# Patient Record
Sex: MALE | Race: WHITE | NOT HISPANIC OR LATINO | Employment: FULL TIME | URBAN - METROPOLITAN AREA
[De-identification: names, ages, dates, MRNs, and addresses within clinical notes are randomized per-mention and may not be internally consistent; named-entity substitution may affect disease eponyms.]

---

## 2021-11-01 ENCOUNTER — OFFICE VISIT (OUTPATIENT)
Dept: URGENT CARE | Facility: URGENT CARE | Age: 36
End: 2021-11-01
Payer: COMMERCIAL

## 2021-11-01 VITALS
DIASTOLIC BLOOD PRESSURE: 78 MMHG | HEART RATE: 57 BPM | SYSTOLIC BLOOD PRESSURE: 124 MMHG | OXYGEN SATURATION: 98 % | TEMPERATURE: 98 F

## 2021-11-01 DIAGNOSIS — S61.411A LACERATION OF RIGHT HAND WITHOUT FOREIGN BODY, INITIAL ENCOUNTER: Primary | ICD-10-CM

## 2021-11-01 PROCEDURE — 12001 RPR S/N/AX/GEN/TRNK 2.5CM/<: CPT | Performed by: PHYSICIAN ASSISTANT

## 2021-11-01 RX ORDER — ARIPIPRAZOLE 15 MG/1
TABLET ORAL
COMMUNITY
Start: 2020-01-02 | End: 2022-10-17

## 2021-11-01 NOTE — PROGRESS NOTES
SUBJECTIVE:     Chief Complaint   Patient presents with     Urgent Care     laceration on R hand between thumb and pointer finger w can lid      Ángel Bardales is a 36 year old male who presents to the clinic with a laceration on the right hand sustained 1 hour(s) ago.  This is a non-work related and accidental injury.    Mechanism of injury: cut with metal top of vegetable can.    Associated symptoms: Denies numbness, weakness, or loss of function  Last tetanus booster within 10 years: yes    EXAM:   The patient appears today in alert,no apparent distress distress  VITALS: /78   Pulse 57   Temp 98  F (36.7  C)   SpO2 98%     Size of laceration: 2 centimeters  Characteristics of the laceration: bleeding- mild, clean, straight and transverse  Tendon function intact: yes  Sensation to light touch intact: yes  Pulses intact: yes  Picture included in patient's chart: no    Assessment:  Laceration of right hand without foreign body, initial encounter    PLAN:  PROCEDURE NOTE::  Wound was locally injected with 3 cc's of Lidocaine 2% with epinephrine  Good anesthesia was obtained  Prepped and draped in the usual sterile fashion  Wound cleaned with HIBICLENS  Wound irrigated  Betadine swabs  X 3   Laceration was closed using 4 5-0 nylon interrupted sutures  After care instructions:  Keep wound clean and dry for the next 24-48 hours  Sutures out in 1 week  Signs of infection discussed today  Apply anti-bacterial ointment for 2 days  May return to work as long as wound is kept clean and dry  Discussed the probability of scarring

## 2022-07-22 ENCOUNTER — HOSPITAL ENCOUNTER (EMERGENCY)
Dept: HOSPITAL 11 - JP.ED | Age: 37
LOS: 1 days | Discharge: OTHER INSTITUTION | End: 2022-07-23
Payer: MEDICAID

## 2022-07-22 DIAGNOSIS — F20.9: ICD-10-CM

## 2022-07-22 DIAGNOSIS — Z20.822: ICD-10-CM

## 2022-07-22 DIAGNOSIS — Z91.14: ICD-10-CM

## 2022-07-22 DIAGNOSIS — F31.9: Primary | ICD-10-CM

## 2022-07-22 LAB — APAP SERPL-MCNC: 0 UG/ML (ref 10–144.9)

## 2022-07-22 PROCEDURE — 81001 URINALYSIS AUTO W/SCOPE: CPT

## 2022-07-22 PROCEDURE — 99285 EMERGENCY DEPT VISIT HI MDM: CPT

## 2022-07-22 PROCEDURE — U0002 COVID-19 LAB TEST NON-CDC: HCPCS

## 2022-07-22 PROCEDURE — 80053 COMPREHEN METABOLIC PANEL: CPT

## 2022-07-22 PROCEDURE — 96372 THER/PROPH/DIAG INJ SC/IM: CPT

## 2022-07-22 PROCEDURE — 80305 DRUG TEST PRSMV DIR OPT OBS: CPT

## 2022-07-22 PROCEDURE — 80307 DRUG TEST PRSMV CHEM ANLYZR: CPT

## 2022-07-22 PROCEDURE — 85025 COMPLETE CBC W/AUTO DIFF WBC: CPT

## 2022-07-22 PROCEDURE — 36415 COLL VENOUS BLD VENIPUNCTURE: CPT

## 2022-07-22 PROCEDURE — 80143 DRUG ASSAY ACETAMINOPHEN: CPT

## 2022-07-22 PROCEDURE — 87635 SARS-COV-2 COVID-19 AMP PRB: CPT

## 2022-07-22 PROCEDURE — 99283 EMERGENCY DEPT VISIT LOW MDM: CPT

## 2022-07-22 PROCEDURE — 80179 DRUG ASSAY SALICYLATE: CPT

## 2022-09-06 DIAGNOSIS — Z79.899 ENCOUNTER FOR LONG-TERM (CURRENT) USE OF MEDICATIONS: Primary | ICD-10-CM

## 2022-09-06 DIAGNOSIS — F31.64: ICD-10-CM

## 2022-09-07 ENCOUNTER — LAB (OUTPATIENT)
Dept: LAB | Facility: CLINIC | Age: 37
End: 2022-09-07
Payer: COMMERCIAL

## 2022-09-07 DIAGNOSIS — F31.64: ICD-10-CM

## 2022-09-07 DIAGNOSIS — Z79.899 ENCOUNTER FOR LONG-TERM (CURRENT) USE OF MEDICATIONS: ICD-10-CM

## 2022-09-07 LAB
LITHIUM SERPL-SCNC: 0.5 MMOL/L (ref 0.6–1.2)
T4 SERPL-MCNC: 6.6 UG/DL (ref 4.5–11.7)

## 2022-09-07 PROCEDURE — 84436 ASSAY OF TOTAL THYROXINE: CPT

## 2022-09-07 PROCEDURE — 80178 ASSAY OF LITHIUM: CPT

## 2022-09-07 PROCEDURE — 36415 COLL VENOUS BLD VENIPUNCTURE: CPT

## 2022-09-07 PROCEDURE — 80048 BASIC METABOLIC PNL TOTAL CA: CPT

## 2022-09-07 PROCEDURE — 84443 ASSAY THYROID STIM HORMONE: CPT

## 2022-09-08 LAB
ANION GAP SERPL CALCULATED.3IONS-SCNC: 8 MMOL/L (ref 3–14)
BUN SERPL-MCNC: 13 MG/DL (ref 7–30)
CALCIUM SERPL-MCNC: 9 MG/DL (ref 8.5–10.1)
CHLORIDE BLD-SCNC: 105 MMOL/L (ref 94–109)
CO2 SERPL-SCNC: 22 MMOL/L (ref 20–32)
CREAT SERPL-MCNC: 0.74 MG/DL (ref 0.66–1.25)
GFR SERPL CREATININE-BSD FRML MDRD: >90 ML/MIN/1.73M2
GLUCOSE BLD-MCNC: 107 MG/DL (ref 70–99)
POTASSIUM BLD-SCNC: 3.4 MMOL/L (ref 3.4–5.3)
SODIUM SERPL-SCNC: 135 MMOL/L (ref 133–144)
TSH SERPL DL<=0.005 MIU/L-ACNC: 1.94 MU/L (ref 0.4–4)

## 2022-10-17 ENCOUNTER — OFFICE VISIT (OUTPATIENT)
Dept: FAMILY MEDICINE | Facility: CLINIC | Age: 37
End: 2022-10-17
Payer: COMMERCIAL

## 2022-10-17 VITALS
BODY MASS INDEX: 44.82 KG/M2 | SYSTOLIC BLOOD PRESSURE: 126 MMHG | HEART RATE: 93 BPM | DIASTOLIC BLOOD PRESSURE: 84 MMHG | HEIGHT: 68 IN | TEMPERATURE: 98 F | WEIGHT: 295.75 LBS

## 2022-10-17 DIAGNOSIS — F31.62 BIPOLAR 1 DISORDER, MIXED, MODERATE (H): ICD-10-CM

## 2022-10-17 DIAGNOSIS — Z23 NEED FOR COVID-19 VACCINE: ICD-10-CM

## 2022-10-17 DIAGNOSIS — R25.2 LEG CRAMPS: Primary | ICD-10-CM

## 2022-10-17 DIAGNOSIS — Z86.39 HISTORY OF DIABETES INSIPIDUS: ICD-10-CM

## 2022-10-17 DIAGNOSIS — Z11.59 NEED FOR HEPATITIS C SCREENING TEST: ICD-10-CM

## 2022-10-17 DIAGNOSIS — Z11.4 SCREENING FOR HIV (HUMAN IMMUNODEFICIENCY VIRUS): ICD-10-CM

## 2022-10-17 PROBLEM — F12.11 HISTORY OF CANNABIS ABUSE: Status: ACTIVE | Noted: 2018-06-17

## 2022-10-17 PROBLEM — E66.813 CLASS 3 SEVERE OBESITY DUE TO EXCESS CALORIES WITHOUT SERIOUS COMORBIDITY WITH BODY MASS INDEX (BMI) OF 40.0 TO 44.9 IN ADULT (H): Status: ACTIVE | Noted: 2019-04-16

## 2022-10-17 PROBLEM — E55.9 VITAMIN D INSUFFICIENCY: Status: ACTIVE | Noted: 2018-06-18

## 2022-10-17 PROBLEM — R10.9 FLANK PAIN: Status: ACTIVE | Noted: 2018-06-20

## 2022-10-17 PROBLEM — R45.851 SUICIDAL IDEATION: Status: ACTIVE | Noted: 2018-06-20

## 2022-10-17 PROBLEM — F99 INSOMNIA DUE TO OTHER MENTAL DISORDER: Status: ACTIVE | Noted: 2022-07-24

## 2022-10-17 PROBLEM — E66.01 CLASS 3 SEVERE OBESITY DUE TO EXCESS CALORIES WITHOUT SERIOUS COMORBIDITY WITH BODY MASS INDEX (BMI) OF 40.0 TO 44.9 IN ADULT (H): Status: ACTIVE | Noted: 2019-04-16

## 2022-10-17 PROBLEM — Z87.891 FORMER SMOKER: Status: ACTIVE | Noted: 2021-05-26

## 2022-10-17 PROBLEM — E78.2 MIXED HYPERLIPIDEMIA: Status: ACTIVE | Noted: 2021-10-05

## 2022-10-17 PROBLEM — F31.64: Status: ACTIVE | Noted: 2018-06-17

## 2022-10-17 PROBLEM — F31.9 BIPOLAR 1 DISORDER, DEPRESSED (H): Status: ACTIVE | Noted: 2020-01-28

## 2022-10-17 PROBLEM — K21.9 GERD (GASTROESOPHAGEAL REFLUX DISEASE): Status: ACTIVE | Noted: 2018-06-20

## 2022-10-17 PROBLEM — F51.05 INSOMNIA DUE TO OTHER MENTAL DISORDER: Status: ACTIVE | Noted: 2022-07-24

## 2022-10-17 PROCEDURE — 0124A COVID-19,PF,PFIZER BOOSTER BIVALENT: CPT | Performed by: FAMILY MEDICINE

## 2022-10-17 PROCEDURE — 91312 COVID-19,PF,PFIZER BOOSTER BIVALENT: CPT | Performed by: FAMILY MEDICINE

## 2022-10-17 PROCEDURE — 99213 OFFICE O/P EST LOW 20 MIN: CPT | Mod: 25 | Performed by: FAMILY MEDICINE

## 2022-10-17 RX ORDER — OLANZAPINE 20 MG/1
TABLET ORAL
COMMUNITY
Start: 2022-09-03 | End: 2024-08-09

## 2022-10-17 RX ORDER — LITHIUM CARBONATE 300 MG/1
1200 TABLET, FILM COATED, EXTENDED RELEASE ORAL DAILY
Status: ON HOLD | COMMUNITY
Start: 2022-10-06 | End: 2024-08-21

## 2022-10-17 RX ORDER — FOLIC ACID/MULTIVIT,IRON,MINER 0.4MG-18MG
1 TABLET ORAL
COMMUNITY

## 2022-10-17 RX ORDER — PROPRANOLOL HYDROCHLORIDE 20 MG/1
TABLET ORAL
COMMUNITY
Start: 2022-10-12 | End: 2024-08-09

## 2022-10-17 RX ORDER — OLANZAPINE 10 MG/1
TABLET ORAL
COMMUNITY
Start: 2022-10-06 | End: 2024-08-09

## 2022-10-17 RX ORDER — TEMAZEPAM 15 MG/1
15 CAPSULE ORAL
COMMUNITY
End: 2024-08-09

## 2022-10-17 NOTE — PROGRESS NOTES
"  1. Leg cramps  This is a 36 yo male with leg cramps - he has \"read online\" and thinks he has \"hyponatremia\" - review of chart suggests h/o diabetes insipidus.  Discussed hydration, exercise, stretching prior to bedtime .  (It should be noted that eventually, history suggested these symptoms occur once every couple months)    2. Bipolar 1 disorder, mixed, moderate (H)  Patient has psychiatry follow up for bipolar disorder - lives in safe setting currently     3. History of diabetes insipidus  Review of chart suggest h/o DI - may contribute to #1    4. Need for COVID-19 vaccine  Due for COVID-19 booster - ordered  - COVID-19,PF,PFIZER BOOSTER BIVALENT (12+YRS)    5. Screening for HIV (human immunodeficiency virus)  6. Need for hepatitis C screening test  Reviewed HM - declines blood work today           Yg Neal is a 36 year old, presenting for the following health issues:  Establish Care and Musculoskeletal Problem (Leg cramps; Concerned for his ability to absorb water)      Gets leg cramps   thinks he has hyponatremia  Gets forearm cramps or back spasms   Cramps that reverberate to front side -   Is certain he isn't able to metabolize water -     Worried about weight gain   Thinks his mood stabilizer isn't associated with weight gain  Doesn't take Abilify    Moved and didn't follow up   Taking fish oil - \"good enough\" - doesn't cause weight gain   Seen by NP - \"don't stop taking fish oil\" -   Late May - delusions - bed bug problem, robbed his sleep -   Next thing was chasing a shiny demon through town -    living in [place] -   Ended up in [place]  -  Started on Lithium     Wouldn't give him his shoes -   Has plantar fasciitis     Was on large doses of lithium - ended up in hospital in [place] -   Swinging from manic to depressive  Pulled over -  pulled over  Ambulance - strapped him in - went to [facility] -   Woke up in [place] -   [place] betrayed and abandoned by doctor/family   Was there for 3 " "weeks - didn't change his medication -   Does a lot of sleeping and eating and breathing  Mom and damon kicked him out of their house -     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 -  Is getting new psychiatrist in December -         History of Present Illness       Reason for visit:  Leg cramps    He eats 2-3 servings of fruits and vegetables daily.He consumes 2 sweetened beverage(s) daily.He exercises with enough effort to increase his heart rate 10 to 19 minutes per day.  He exercises with enough effort to increase his heart rate 3 or less days per week. He is missing 1 dose(s) of medications per week.             Review of Systems   Constitutional: Negative for chills, fever and unexpected weight change.   HENT: Negative.  Negative for congestion and sore throat.    Eyes: Negative for visual disturbance.   Respiratory: Negative for cough and shortness of breath.    Cardiovascular: Negative for chest pain and peripheral edema.   Gastrointestinal: Negative.    Endocrine: Negative for polydipsia and polyuria.   Genitourinary: Negative.    Musculoskeletal:        Leg cramps     Skin: Negative.    Allergic/Immunologic: Negative.    Neurological: Negative.    Hematological: Does not bruise/bleed easily.   Psychiatric/Behavioral: Positive for agitation and sleep disturbance. The patient is nervous/anxious.    All other systems reviewed and are negative.           Objective    /84 (BP Location: Right arm, Patient Position: Sitting, Cuff Size: Adult Large)   Pulse 93   Temp 98  F (36.7  C)   Ht 1.727 m (5' 7.99\")   Wt 134.2 kg (295 lb 12 oz)   BMI 44.98 kg/m    Body mass index is 44.98 kg/m .  Physical Exam  Constitutional:       General: He is not in acute distress.     Appearance: He is well-developed. He is not ill-appearing.   HENT:      Head: Normocephalic and " atraumatic.      Right Ear: Tympanic membrane, ear canal and external ear normal.      Left Ear: Tympanic membrane, ear canal and external ear normal.      Nose: Nose normal.      Mouth/Throat:      Mouth: Mucous membranes are moist. No oral lesions.   Eyes:      Extraocular Movements: Extraocular movements intact.      Conjunctiva/sclera: Conjunctivae normal.   Neck:      Thyroid: No thyromegaly.      Trachea: No tracheal deviation.   Cardiovascular:      Rate and Rhythm: Normal rate and regular rhythm.      Heart sounds: Normal heart sounds, S1 normal and S2 normal. No murmur heard.    No S3 or S4 sounds.   Pulmonary:      Effort: Pulmonary effort is normal. No respiratory distress.      Breath sounds: Normal breath sounds. No wheezing or rales.   Abdominal:      General: Bowel sounds are normal.      Palpations: Abdomen is soft. There is no mass.      Tenderness: There is no abdominal tenderness.   Musculoskeletal:         General: No deformity. Normal range of motion.      Cervical back: Neck supple.   Lymphadenopathy:      Cervical: No cervical adenopathy.   Skin:     General: Skin is warm and dry.      Findings: No lesion or rash.   Neurological:      General: No focal deficit present.      Mental Status: He is alert and oriented to person, place, and time.      Motor: No abnormal muscle tone.      Deep Tendon Reflexes: Reflexes are normal and symmetric.   Psychiatric:         Speech: Speech normal.         Behavior: Behavior normal.         Thought Content: Thought content normal.         Judgment: Judgment normal.            No results found for any visits on 10/17/22.

## 2022-10-23 ASSESSMENT — ENCOUNTER SYMPTOMS
GASTROINTESTINAL NEGATIVE: 1
COUGH: 0
CHILLS: 0
SHORTNESS OF BREATH: 0
POLYDIPSIA: 0
FEVER: 0
NERVOUS/ANXIOUS: 1
BRUISES/BLEEDS EASILY: 0
UNEXPECTED WEIGHT CHANGE: 0
AGITATION: 1
NEUROLOGICAL NEGATIVE: 1
SLEEP DISTURBANCE: 1
SORE THROAT: 0
ALLERGIC/IMMUNOLOGIC NEGATIVE: 1

## 2022-11-22 ENCOUNTER — NURSE TRIAGE (OUTPATIENT)
Dept: NURSING | Facility: CLINIC | Age: 37
End: 2022-11-22

## 2022-11-22 NOTE — TELEPHONE ENCOUNTER
Call from patient who is reporting vertigo since Sunday night after taking temazepam 15 mg and melatonin 4 mg.  Patient reports he ha not have vertigo previously w/ the use of temazepam.    Patient has not taken any temazepam since Joni night. He reports no changes in medication other than decrease in his olanzapine dose. Denies alcohol or illicit drug use.    Patient says symptom got better after he ate some food but it is still present. He reports he can get up and walk but says it has been difficult enough for him to be cautious about driving himself.    Patient reports previous bouts of vertigo but says they have been manageable before.     Assessment/Disposition: be seen now.  Advised evaluation w/ a provider whether it is w/ a primary care or urgent care.    Caller verbalized understanding.      Antonina Quispe, RN, BSN  Triage Nurse Advisor          Reason for Disposition    Spinning or tilting sensation (vertigo) present now    Additional Information    Negative: SEVERE dizziness (e.g., unable to stand, requires support to walk, feels like passing out now)    Negative: SEVERE headache or neck pain    Negative: Spinning or tilting sensation (vertigo) present now and one or more stroke risk factors (i.e., hypertension, diabetes mellitus, prior stroke/TIA, heart attack, age over 60) (Exception: prior physician evaluation for this AND no different/worse than usual)    Negative: Neurologic deficit that was brief (now gone), ANY of the following:* Weakness of the face, arm, or leg on one side of the body* Numbness of the face, arm, or leg on one side of the body* Loss of speech or garbled speech    Negative: Loss of vision or double vision  (Exception: Similar to previous migraines.)    Negative: Extra heart beats OR irregular heart beating (i.e., 'palpitations')    Negative: Difficulty breathing    Negative: Drinking very little and has signs of dehydration (e.g., no urine > 12 hours, very dry mouth, very  lightheaded)    Negative: Follows bleeding (e.g., stomach, rectum, vagina)  (Exception: Became dizzy from sight of small amount blood.)    Negative: SEVERE difficulty breathing (e.g., struggling for each breath, speaks in single words)    Negative: Shock suspected (e.g., cold/pale/clammy skin, too weak to stand, low BP, rapid pulse)    Negative: Difficult to awaken or acting confused (e.g., disoriented, slurred speech)    Negative: Fainted, and still feels dizzy afterwards    Negative: Overdose (accidental or intentional) of medications    Negative: New neurologic deficit that is present now: * Weakness of the face, arm, or leg on one side of the body * Numbness of the face, arm, or leg on one side of the body * Loss of speech or garbled speech    Negative: Heart beating < 50 beats per minute OR > 140 beats per minute    Negative: Sounds like a life-threatening emergency to the triager    Negative: Patient sounds very sick or weak to the triager    Negative: Lightheadedness (dizziness) present now, after 2 hours of rest and fluids    Protocols used: DIZZINESS-A-OH

## 2023-01-24 ENCOUNTER — TELEPHONE (OUTPATIENT)
Dept: FAMILY MEDICINE | Facility: CLINIC | Age: 38
End: 2023-01-24
Payer: COMMERCIAL

## 2023-01-24 NOTE — TELEPHONE ENCOUNTER
General Call    Contacts       Type Contact Phone/Fax    01/24/2023 04:50 PM CST Phone (Incoming) Ángel Bardales (Self) [phone] (H)        Reason for Call:  Asking for immunization records for COVID.    What are your questions or concerns:  Starting new job and needs COVID records.  Emailed to [email or web address]    Okay to leave a detailed message?: No task completed. Thanks    Call taken on 1/24/23 at 445 pm by ISABELLA Navarro

## 2023-09-06 ENCOUNTER — APPOINTMENT (OUTPATIENT)
Dept: LAB | Facility: CLINIC | Age: 38
End: 2023-09-06
Payer: COMMERCIAL

## 2023-09-07 DIAGNOSIS — Z79.899 HIGH RISK MEDICATION USE: Primary | ICD-10-CM

## 2023-09-11 ENCOUNTER — LAB (OUTPATIENT)
Dept: LAB | Facility: CLINIC | Age: 38
End: 2023-09-11
Payer: COMMERCIAL

## 2023-09-11 DIAGNOSIS — Z79.899 HIGH RISK MEDICATION USE: ICD-10-CM

## 2023-09-11 LAB
ANION GAP SERPL CALCULATED.3IONS-SCNC: 11 MMOL/L (ref 7–15)
BUN SERPL-MCNC: 12.6 MG/DL (ref 6–20)
CALCIUM SERPL-MCNC: 9.6 MG/DL (ref 8.6–10)
CHLORIDE SERPL-SCNC: 106 MMOL/L (ref 98–107)
CHOLEST SERPL-MCNC: 187 MG/DL
CREAT SERPL-MCNC: 0.83 MG/DL (ref 0.67–1.17)
DEPRECATED HCO3 PLAS-SCNC: 24 MMOL/L (ref 22–29)
EGFRCR SERPLBLD CKD-EPI 2021: >90 ML/MIN/1.73M2
GLUCOSE SERPL-MCNC: 133 MG/DL (ref 70–99)
HDLC SERPL-MCNC: 25 MG/DL
LDLC SERPL CALC-MCNC: 82 MG/DL
NONHDLC SERPL-MCNC: 162 MG/DL
POTASSIUM SERPL-SCNC: 4 MMOL/L (ref 3.4–5.3)
SODIUM SERPL-SCNC: 141 MMOL/L (ref 136–145)
TRIGL SERPL-MCNC: 399 MG/DL

## 2023-09-11 PROCEDURE — 80048 BASIC METABOLIC PNL TOTAL CA: CPT

## 2023-09-11 PROCEDURE — 80061 LIPID PANEL: CPT

## 2023-09-11 PROCEDURE — 36415 COLL VENOUS BLD VENIPUNCTURE: CPT

## 2023-09-20 ENCOUNTER — VIRTUAL VISIT (OUTPATIENT)
Dept: FAMILY MEDICINE | Facility: CLINIC | Age: 38
End: 2023-09-20
Payer: COMMERCIAL

## 2023-09-20 DIAGNOSIS — E78.1 HYPERTRIGLYCERIDEMIA: Primary | ICD-10-CM

## 2023-09-20 DIAGNOSIS — R73.01 ELEVATED FASTING BLOOD SUGAR: ICD-10-CM

## 2023-09-20 PROCEDURE — 99214 OFFICE O/P EST MOD 30 MIN: CPT | Mod: VID | Performed by: PHYSICIAN ASSISTANT

## 2023-09-20 RX ORDER — OMEGA-3/DHA/EPA/FISH OIL 300-1000MG
2000 CAPSULE,DELAYED RELEASE (ENTERIC COATED) ORAL 2 TIMES DAILY
Qty: 120 CAPSULE | Refills: 11 | Status: SHIPPED | OUTPATIENT
Start: 2023-09-20 | End: 2024-08-09

## 2023-09-20 RX ORDER — TRAZODONE HYDROCHLORIDE 50 MG/1
100 TABLET, FILM COATED ORAL
Status: ON HOLD | COMMUNITY
Start: 2023-07-17 | End: 2024-08-21

## 2023-09-20 RX ORDER — QUETIAPINE 400 MG/1
400 TABLET, FILM COATED, EXTENDED RELEASE ORAL AT BEDTIME
COMMUNITY
Start: 2023-08-12 | End: 2024-08-09

## 2023-09-20 ASSESSMENT — PATIENT HEALTH QUESTIONNAIRE - PHQ9: SUM OF ALL RESPONSES TO PHQ QUESTIONS 1-9: 5

## 2023-09-20 NOTE — PROGRESS NOTES
"Ángel is a 37 year old who is being evaluated via a billable video visit.      How would you like to obtain your AVS? MyChart  If the video visit is dropped, the invitation should be resent by: Text to cell phone: [phone]  Will anyone else be joining your video visit? No          Assessment & Plan     Hypertriglyceridemia  -likely 2/2 atypical antipsychotic + obesity  -discussed diet.  Will mail AVS for more info  -nutritionist referral placed  -start omega 3 2g BID.  Recheck lbas in 2-3 months  - Omega-3 Fatty Acids (ODORLESS COATED FISH OIL) 1000 MG CPDR; Take 2,000 mg by mouth 2 times daily  - Lipid Profile (Chol, Trig, HDL, LDL calc); Future  - Hemoglobin A1c; Future  - Nutrition Referral; Future    Elevated fasting blood sugar  -lifestyle changes discussed  -recheck in 3 months, A1C order placed.    - Lipid Profile (Chol, Trig, HDL, LDL calc); Future  - Hemoglobin A1c; Future  - Nutrition Referral; Future             BMI:   Estimated body mass index is 44.98 kg/m  as calculated from the following:    Height as of 10/17/22: 1.727 m (5' 7.99\").    Weight as of 10/17/22: 134.2 kg (295 lb 12 oz).     Geena Valentine PA-C  [facility]    Subjective   Ángel is a 37 year old, presenting for the following health issues:  Lab Result Notice      9/20/2023     9:53 AM   Additional Questions   Roomed by Alissa   Accompanied by self       -pt here to discuss labs    -on antipsychotic (quetiapine 400mg at bedtime) and manages this with Christa (h/o Bipolar I)    -notified of elevated TGs (399) and FBS (133)  -does not exercise much, foot pain           Review of Systems   Constitutional, HEENT, cardiovascular, pulmonary, gi and gu systems are negative, except as otherwise noted.      Objective           Vitals:  No vitals were obtained today due to virtual visit.    Physical Exam   GENERAL: Healthy, alert and no distress  EYES: Eyes grossly normal to inspection.  No discharge or " erythema, or obvious scleral/conjunctival abnormalities.  RESP: No audible wheeze, cough, or visible cyanosis.  No visible retractions or increased work of breathing.    SKIN: Visible skin clear. No significant rash, abnormal pigmentation or lesions.  NEURO: Cranial nerves grossly intact.  Mentation and speech appropriate for age.  PSYCH: Mentation appears normal, affect normal/bright, judgement and insight intact, normal speech and appearance well-groomed.    Lab on 09/11/2023   Component Date Value Ref Range Status    Cholesterol 09/11/2023 187  <200 mg/dL Final    Triglycerides 09/11/2023 399 (H)  <150 mg/dL Final    Direct Measure HDL 09/11/2023 25 (L)  >=40 mg/dL Final    LDL Cholesterol Calculated 09/11/2023 82  <=100 mg/dL Final    Non HDL Cholesterol 09/11/2023 162 (H)  <130 mg/dL Final    Sodium 09/11/2023 141  136 - 145 mmol/L Final    Potassium 09/11/2023 4.0  3.4 - 5.3 mmol/L Final    Chloride 09/11/2023 106  98 - 107 mmol/L Final    Carbon Dioxide (CO2) 09/11/2023 24  22 - 29 mmol/L Final    Anion Gap 09/11/2023 11  7 - 15 mmol/L Final    Urea Nitrogen 09/11/2023 12.6  6.0 - 20.0 mg/dL Final    Creatinine 09/11/2023 0.83  0.67 - 1.17 mg/dL Final    Calcium 09/11/2023 9.6  8.6 - 10.0 mg/dL Final    Glucose 09/11/2023 133 (H)  70 - 99 mg/dL Final    GFR Estimate 09/11/2023 >90  >60 mL/min/1.73m2 Final             Video-Visit Details    Type of service:  Video Visit   Video Start Time:  1015am  Video End Time:10:39 AM    Originating Location (pt. Location): Home    Distant Location (provider location):  Off-site  Platform used for Video Visit: Naeem

## 2023-12-05 DIAGNOSIS — Z79.899 HIGH RISK MEDICATION USE: Primary | ICD-10-CM

## 2024-04-06 ENCOUNTER — TELEPHONE (OUTPATIENT)
Dept: BEHAVIORAL HEALTH | Facility: CLINIC | Age: 39
End: 2024-04-06
Payer: COMMERCIAL

## 2024-04-06 NOTE — TELEPHONE ENCOUNTER
S: [facility], DEC  Gisell  calling at 3:09 PM about a 38 year old/Male and Female presenting with Psychosis and Yareli     B: Pt arrived via Family. Presenting problem, stressors: Psychosis and Yareli   Pt having AH of birds and random things.  Pt endorses he has not slept in 48 hours, eating very little.      Pt affect in ED: Labile  Pt Dx: Bipolar Disorder and Psychosis  Previous IP hx? Yes: July 2022    Pt endorses SI with a plan to drown self    Hx of suicide attempt? No  Pt denies SIB  Pt denies HI   Pt endorses auditory hallucinations .   Pt RARS Score: N/A  July 2022  [place]  Hx of aggression/violence, sexual offenses, legal concerns, Epic care plan? describe: No  Current concerns for aggression this visit? No [place] only.  Does pt have a history of Civil Commitment? No  Is Pt their own guardian? Yes    Pt is prescribed medication. Is patient medication compliant? Yes  Pt endorses OP services: Psychiatrist  CD concerns: None  Acute or chronic medical concerns: None    Does Pt present with specific needs, assistive devices, or exclusionary criteria? None      Pt is ambulatory  Pt is able to perform ADLs independently      A: Pt to be reviewed for [facility] admission. Pt is Voluntary  Preferred placement: [facility] ONLY    COVID Symptoms: No  If yes, COVID test required   Utox:  awaiting fax from [facility]    CMP: N/A  CBC: N/A  HCG: N/A    R: Patient cleared and ready for behavioral bed placement: Yes  Pt placed on [facility] worklist? Yes    Does Patient need a Transfer Center request created? Yes, writer completed Transfer Center request at: 3:19PM

## 2024-04-06 NOTE — TELEPHONE ENCOUNTER
R: [place]  Access Inpatient Bed Call Log 4/6/24  @3:50 PM     Intake has called facilities that have not updated the bed status within the last 12 hours.                             [facility] is at capacity.             Pt remains on the work list pending appropriate bed availability.

## 2024-04-07 ENCOUNTER — TELEPHONE (OUTPATIENT)
Dept: BEHAVIORAL HEALTH | Facility: CLINIC | Age: 39
End: 2024-04-07
Payer: COMMERCIAL

## 2024-04-07 NOTE — TELEPHONE ENCOUNTER
Bed search ([facility] only) @ 12:33 PM:     [facility]: No appropriate bed available     Called  ED to confirm pt is still in ED awaiting [facility] placement. Pt remains on [facility] pending appropriate bed availability.     5:27 PM Called  ED and requested clinical to be refaxed as it is not in RF folder.

## 2024-04-07 NOTE — TELEPHONE ENCOUNTER
Bed search ([facility] only) @ 12AM:    [facility]: No appropriate bed available    Called  ED and spoke to Angella who reports pt still in ED awaiting [facility] placement. Informed Angella pt remains on [facility] pending appropriate bed availability

## 2024-04-08 ENCOUNTER — TELEPHONE (OUTPATIENT)
Dept: BEHAVIORAL HEALTH | Facility: CLINIC | Age: 39
End: 2024-04-08
Payer: COMMERCIAL

## 2024-04-08 NOTE — TELEPHONE ENCOUNTER
R:  Pt on adult worklist for bed @ [facility].     Currently no bed availability within [facility] @ 7:45am.    PPS will continue to monitor [facility] for discharge's.    PPS reviewed for bed availability again at [facility] @ 11:15am.  No bed availability for review.  Pt will remain on the adult worklist pending bed availability for review

## 2024-04-08 NOTE — TELEPHONE ENCOUNTER
Bed search ([facility] only) @ 11:39PM:    [facility]: No appropriate bed available    Called  ED to confirm pt still in ED awaiting [facility] placement. Pt remains on wait list pending appropriate bed availability

## 2024-04-08 NOTE — TELEPHONE ENCOUNTER
R: 11:00 PM Bed Search Update [facility] Only    No appropriate beds available.  Pt remains on [facility] worklist awaiting appropriate placement.

## 2024-04-08 NOTE — TELEPHONE ENCOUNTER
R:    [4:48 PM] Sushma Gallego pt PA [phone] has been admitted to [facility].    Intake notes pt removed from active placement [facility]. Intake will no longer follow for [facility] admission.    [Initial Evaluation] : an initial evaluation [FreeTextEntry1] : Recurrent UTI

## 2024-08-09 ENCOUNTER — TELEPHONE (OUTPATIENT)
Dept: BEHAVIORAL HEALTH | Facility: CLINIC | Age: 39
End: 2024-08-09

## 2024-08-09 ENCOUNTER — HOSPITAL ENCOUNTER (INPATIENT)
Facility: CLINIC | Age: 39
LOS: 9 days | Discharge: HOME | End: 2024-08-22
Attending: PSYCHIATRY & NEUROLOGY | Admitting: PSYCHIATRY & NEUROLOGY
Payer: COMMERCIAL

## 2024-08-09 ENCOUNTER — TELEPHONE (OUTPATIENT)
Dept: BEHAVIORAL HEALTH | Facility: CLINIC | Age: 39
End: 2024-08-09
Payer: COMMERCIAL

## 2024-08-09 DIAGNOSIS — Z87.891 FORMER SMOKER: ICD-10-CM

## 2024-08-09 DIAGNOSIS — F51.05 INSOMNIA DUE TO OTHER MENTAL DISORDER: ICD-10-CM

## 2024-08-09 DIAGNOSIS — Z91.148 NON COMPLIANCE W MEDICATION REGIMEN: ICD-10-CM

## 2024-08-09 DIAGNOSIS — F25.0 SCHIZOAFFECTIVE DISORDER, BIPOLAR TYPE (H): ICD-10-CM

## 2024-08-09 DIAGNOSIS — F99 INSOMNIA DUE TO OTHER MENTAL DISORDER: ICD-10-CM

## 2024-08-09 DIAGNOSIS — R25.1 TREMOR: ICD-10-CM

## 2024-08-09 DIAGNOSIS — F31.12 BIPOLAR AFFECTIVE DISORDER, CURRENTLY MANIC, MODERATE (H): ICD-10-CM

## 2024-08-09 DIAGNOSIS — F41.9 ANXIETY: Primary | ICD-10-CM

## 2024-08-09 DIAGNOSIS — R73.03 PREDIABETES: ICD-10-CM

## 2024-08-09 DIAGNOSIS — F31.62 BIPOLAR 1 DISORDER, MIXED, MODERATE (H): ICD-10-CM

## 2024-08-09 DIAGNOSIS — G47.00 INSOMNIA, UNSPECIFIED TYPE: ICD-10-CM

## 2024-08-09 DIAGNOSIS — T43.216A UNDERDOSING OF SELECTIVE SEROTONIN AND NOREPINEPHRINE REUPTAKE INHIBITORS, INITIAL ENCOUNTER: ICD-10-CM

## 2024-08-09 PROBLEM — F31.9 BIPOLAR 1 DISORDER (H): Status: ACTIVE | Noted: 2024-08-09

## 2024-08-09 LAB
ALBUMIN SERPL BCG-MCNC: 4.7 G/DL (ref 3.5–5.2)
ALP SERPL-CCNC: 75 U/L (ref 40–150)
ALT SERPL W P-5'-P-CCNC: 39 U/L (ref 0–70)
AMPHETAMINES UR QL SCN: NORMAL
ANION GAP SERPL CALCULATED.3IONS-SCNC: 14 MMOL/L (ref 7–15)
AST SERPL W P-5'-P-CCNC: 25 U/L (ref 0–45)
BARBITURATES UR QL SCN: NORMAL
BASOPHILS # BLD AUTO: 0.1 10E3/UL (ref 0–0.2)
BASOPHILS NFR BLD AUTO: 1 %
BENZODIAZ UR QL SCN: NORMAL
BILIRUB SERPL-MCNC: 0.6 MG/DL
BUN SERPL-MCNC: 12.3 MG/DL (ref 6–20)
BZE UR QL SCN: NORMAL
CALCIUM SERPL-MCNC: 10.5 MG/DL (ref 8.8–10.4)
CANNABINOIDS UR QL SCN: NORMAL
CHLORIDE SERPL-SCNC: 98 MMOL/L (ref 98–107)
CREAT SERPL-MCNC: 0.83 MG/DL (ref 0.67–1.17)
EGFRCR SERPLBLD CKD-EPI 2021: >90 ML/MIN/1.73M2
EOSINOPHIL # BLD AUTO: 0.2 10E3/UL (ref 0–0.7)
EOSINOPHIL NFR BLD AUTO: 2 %
ERYTHROCYTE [DISTWIDTH] IN BLOOD BY AUTOMATED COUNT: 12.6 % (ref 10–15)
FENTANYL UR QL: NORMAL
GLUCOSE SERPL-MCNC: 170 MG/DL (ref 70–99)
HCO3 SERPL-SCNC: 24 MMOL/L (ref 22–29)
HCT VFR BLD AUTO: 41.3 % (ref 40–53)
HGB BLD-MCNC: 14.2 G/DL (ref 13.3–17.7)
IMM GRANULOCYTES # BLD: 0 10E3/UL
IMM GRANULOCYTES NFR BLD: 0 %
LITHIUM SERPL-SCNC: 1.08 MMOL/L (ref 0.6–1.2)
LYMPHOCYTES # BLD AUTO: 3.1 10E3/UL (ref 0.8–5.3)
LYMPHOCYTES NFR BLD AUTO: 26 %
MCH RBC QN AUTO: 29.8 PG (ref 26.5–33)
MCHC RBC AUTO-ENTMCNC: 34.4 G/DL (ref 31.5–36.5)
MCV RBC AUTO: 87 FL (ref 78–100)
MONOCYTES # BLD AUTO: 0.6 10E3/UL (ref 0–1.3)
MONOCYTES NFR BLD AUTO: 5 %
NEUTROPHILS # BLD AUTO: 7.9 10E3/UL (ref 1.6–8.3)
NEUTROPHILS NFR BLD AUTO: 66 %
NRBC # BLD AUTO: 0 10E3/UL
NRBC BLD AUTO-RTO: 0 /100
OPIATES UR QL SCN: NORMAL
PCP QUAL URINE (ROCHE): NORMAL
PLATELET # BLD AUTO: 243 10E3/UL (ref 150–450)
POTASSIUM SERPL-SCNC: 3.5 MMOL/L (ref 3.4–5.3)
PROT SERPL-MCNC: 7.8 G/DL (ref 6.4–8.3)
RBC # BLD AUTO: 4.76 10E6/UL (ref 4.4–5.9)
SODIUM SERPL-SCNC: 136 MMOL/L (ref 135–145)
WBC # BLD AUTO: 11.9 10E3/UL (ref 4–11)

## 2024-08-09 PROCEDURE — 80307 DRUG TEST PRSMV CHEM ANLYZR: CPT | Performed by: PSYCHIATRY & NEUROLOGY

## 2024-08-09 PROCEDURE — 250N000013 HC RX MED GY IP 250 OP 250 PS 637: Performed by: PSYCHIATRY & NEUROLOGY

## 2024-08-09 PROCEDURE — 99285 EMERGENCY DEPT VISIT HI MDM: CPT | Performed by: PSYCHIATRY & NEUROLOGY

## 2024-08-09 PROCEDURE — 80053 COMPREHEN METABOLIC PANEL: CPT | Performed by: PSYCHIATRY & NEUROLOGY

## 2024-08-09 PROCEDURE — 85004 AUTOMATED DIFF WBC COUNT: CPT | Performed by: PSYCHIATRY & NEUROLOGY

## 2024-08-09 PROCEDURE — 83036 HEMOGLOBIN GLYCOSYLATED A1C: CPT | Performed by: REGISTERED NURSE

## 2024-08-09 PROCEDURE — 36415 COLL VENOUS BLD VENIPUNCTURE: CPT | Performed by: PSYCHIATRY & NEUROLOGY

## 2024-08-09 PROCEDURE — 80178 ASSAY OF LITHIUM: CPT | Performed by: PSYCHIATRY & NEUROLOGY

## 2024-08-09 PROCEDURE — 84443 ASSAY THYROID STIM HORMONE: CPT | Performed by: REGISTERED NURSE

## 2024-08-09 RX ORDER — VIT C/B6/B5/MAGNESIUM/HERB 173 50-5-6-5MG
CAPSULE ORAL DAILY
COMMUNITY

## 2024-08-09 RX ORDER — CALCIUM CARBONATE 500 MG/1
500 TABLET, CHEWABLE ORAL DAILY PRN
Status: DISCONTINUED | OUTPATIENT
Start: 2024-08-09 | End: 2024-08-22 | Stop reason: HOSPADM

## 2024-08-09 RX ORDER — OLANZAPINE 10 MG/2ML
10 INJECTION, POWDER, FOR SOLUTION INTRAMUSCULAR 2 TIMES DAILY PRN
Status: DISCONTINUED | OUTPATIENT
Start: 2024-08-09 | End: 2024-08-13

## 2024-08-09 RX ORDER — CALCIUM/MAGNESIUM/ZINC 333-133 MG
TABLET ORAL DAILY
COMMUNITY

## 2024-08-09 RX ORDER — TRAZODONE HYDROCHLORIDE 50 MG/1
100 TABLET, FILM COATED ORAL AT BEDTIME
Status: DISCONTINUED | OUTPATIENT
Start: 2024-08-09 | End: 2024-08-13

## 2024-08-09 RX ORDER — LANOLIN ALCOHOL/MO/W.PET/CERES
CREAM (GRAM) TOPICAL AT BEDTIME
COMMUNITY

## 2024-08-09 RX ORDER — OLANZAPINE 5 MG/1
5 TABLET, ORALLY DISINTEGRATING ORAL SEE ADMIN INSTRUCTIONS
Status: ON HOLD | COMMUNITY
End: 2024-08-21

## 2024-08-09 RX ORDER — ZOLPIDEM TARTRATE 10 MG/1
10 TABLET ORAL
Status: ON HOLD | COMMUNITY
End: 2024-08-21

## 2024-08-09 RX ORDER — OLANZAPINE 20 MG/1
20 TABLET, ORALLY DISINTEGRATING ORAL AT BEDTIME
Status: DISCONTINUED | OUTPATIENT
Start: 2024-08-09 | End: 2024-08-22 | Stop reason: HOSPADM

## 2024-08-09 RX ORDER — OLANZAPINE 10 MG/1
10 TABLET, ORALLY DISINTEGRATING ORAL 2 TIMES DAILY PRN
Status: DISCONTINUED | OUTPATIENT
Start: 2024-08-09 | End: 2024-08-13

## 2024-08-09 RX ADMIN — CALCIUM CARBONATE (ANTACID) CHEW TAB 500 MG 500 MG: 500 CHEW TAB at 20:16

## 2024-08-09 RX ADMIN — TRAZODONE HYDROCHLORIDE 100 MG: 50 TABLET ORAL at 21:57

## 2024-08-09 RX ADMIN — LITHIUM CARBONATE 1200 MG: 450 TABLET, EXTENDED RELEASE ORAL at 20:16

## 2024-08-09 RX ADMIN — OLANZAPINE 10 MG: 10 TABLET, ORALLY DISINTEGRATING ORAL at 21:56

## 2024-08-09 RX ADMIN — OLANZAPINE 20 MG: 20 TABLET, ORALLY DISINTEGRATING ORAL at 20:15

## 2024-08-09 ASSESSMENT — ACTIVITIES OF DAILY LIVING (ADL)
ADLS_ACUITY_SCORE: 35

## 2024-08-09 ASSESSMENT — COLUMBIA-SUICIDE SEVERITY RATING SCALE - C-SSRS
6. HAVE YOU EVER DONE ANYTHING, STARTED TO DO ANYTHING, OR PREPARED TO DO ANYTHING TO END YOUR LIFE?: YES
1. IN THE PAST MONTH, HAVE YOU WISHED YOU WERE DEAD OR WISHED YOU COULD GO TO SLEEP AND NOT WAKE UP?: NO
2. HAVE YOU ACTUALLY HAD ANY THOUGHTS OF KILLING YOURSELF IN THE PAST MONTH?: NO
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
## 2024-08-09 NOTE — ED TRIAGE NOTES
Here with his father referral from [facility] and pts here for med refill.  [facility] was closed yesterday.     Pt reports is near to out of medications (zyprexa, trazodone, ambien, lithium),. Only physical complaint is lack of sleep and a HA 6/10.

## 2024-08-09 NOTE — PROGRESS NOTES
Ángel Jason  August 9, 2024  Plan of Care Hand-off Note     Patient Care Path: inpatient mental health    Plan for Care:   Patient presents with acutely decompensated symptoms of bipolar disorder. Patient presents with labile affect and irritable mood. Patient has been non-compliant with medication regimen, not sleeping, and with increasingly labile mood. Patient has disorganzied and tangential thoughts during assessment. Patient is voluntary for inpatient admission for stabilization and medication management.    Identified Goals and Safety Issues: (P) medication management and crisis stabilization    Overview:  (P) AMBROSE JASON (Brother) [phone]            Legal Status: Legal Status at Admission: Voluntary/Patient has signed consent for treatment    Psychiatry Consult: ordered       Updated MD regarding plan of care.           TOM Mark
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
## 2024-08-09 NOTE — TELEPHONE ENCOUNTER
R:    12:51 PM Received call from Juan Manuel Henry (Phone # [phone]) providing collateral information about pt coming into [facility] ED:    Pt briefly hospitalized 3 times in the last 3 months (once every month). Pt ramped up again and pt's father called [facility] to speak with pt. Pt is not sleeping, up singing and rhyming/rap kinds of stuff according to pt's father. Pt gets fixated on rhyming words even during [facility]'s assessment of the pt.    [facility] does not believe pt is taking medications most of the time and believe pt has not taking trazodone for the last few nights, 1-2 hours a sleep for the last 3-4 days per pt's father. A little agitated and aggressive but was able to keep it somewhat in control. [facility] inquiring if here is any services for pt such as Case Management or even an IRTS facility as pt's father is a little worn out. Please feel free to connect with [facility] for any additional questions/inquiries.
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
## 2024-08-09 NOTE — PLAN OF CARE
Ángel Jason  August 9, 2024  Plan of Care Hand-off Note     Patient Care Path: inpatient mental health    Plan for Care:   Patient presents with acutely decompensated symptoms of bipolar disorder. Patient presents with labile affect and irritable mood. Patient has been non-compliant with medication regimen, not sleeping, and with increasingly labile mood. Patient has disorganzied and tangential thoughts during assessment. Patient is voluntary for inpatient admission for stabilization and medication management.    Identified Goals and Safety Issues: medication management and crisis stabilization    Overview:  AMBROSE JASON (Brother) [phone]            Legal Status: Legal Status at Admission: Voluntary/Patient has signed consent for treatment    Psychiatry Consult: ordered       Updated MD regarding plan of care.           TOM Mark

## 2024-08-09 NOTE — ED NOTES
IP MH Referral Acuity Rating Score (RARS)    LMHP complete at referral to IP MH, with DEC; and, daily while awaiting IP MH placement. Call score to PPS.  CRITERIA SCORING   New 72 HH and Involuntary for IP MH (not adolescent) 0/1   Boarding over 24 hours 0/1   Vulnerable adult at least 55+ with multiple co morbidities; or, Patient age 11 or under 0/1   Suicide ideation without relief of precipitating factors 0/1   Current plan for suicide 0/1   Current plan for homicide 0/1   Imminent risk or actual attempt to seriously harm another without relief of factors precipitating the attempt 0/1   Severe dysfunction in daily living (ex: complete neglect for self care, extreme disruption in vegetative function, extreme deterioration in social interactions) 1/1   Recent (last 2 weeks) or current physical aggression in the ED 0/1   Restraints or seclusion episode in ED 0/1   Verbal aggression, agitation, yelling, etc., while in the ED 1/1   Active psychosis with psychomotor agitation or catatonia 0/1   Need for constant or near constant redirection (from leaving, from others, etc).  1/1   Intrusive or disruptive behaviors 1/1   TOTAL Acuity Total Score: 4

## 2024-08-09 NOTE — PHARMACY-ADMISSION MEDICATION HISTORY
Pharmacist Admission Medication History    Admission medication history is complete. The information provided in this note is only as accurate as the sources available at the time of the update.    Information Source(s): Patient via in-person    Pertinent Information: Patient was able to state what medications and the number of tablets he takes daily but not the strength of the tablets. Patient states his dad helps him with medications but dad's number not found in chart. States that his medications have not changed since his last admission in June. Doses updated to reflect tablet strength that was dispensed during the admission on 6/28/24. Patient has a recent prescription fill for metformin  mg daily with dinner. He did not state he was currently on it when listing what medications he is on and it was not listed in his last admission list. Will not add to PTA at this time since unable to verify if taking.     Changes made to PTA medication list:  Added:   Milk thistle - unknown strength    Turmeric - unknown strength   Melatonin - unknown strength   Zolpidem   Deleted:   Duplicate fish oil   Temazepam   Quetiapine   Propranolol   Changed:   Olanzapine - ODT formulation and now 5 mg PM/10 mg PM  Trazodone - increased to 100 mg   Lithium - added directions     Allergies reviewed with patient and updates made in EHR: yes    Medication History Completed By: Audra Mckay [facility] 8/9/2024 6:04 PM    PTA Med List   Medication Sig Last Dose    lithium ER (LITHOBID) 300 MG CR tablet Take 1,200 mg by mouth daily Unknown    melatonin 3 MG tablet Take by mouth at bedtime Unknown    Milk Thistle-Dand-Fennel-Licor (MILK THISTLE XTRA) CAPS capsule Take by mouth daily Unknown    OLANZapine zydis (ZYPREXA) 5 MG ODT Take 5 mg by mouth See Admin Instructions Take 1 tablet (5 mg) in the morning and 2 tablets (10 mg) in the evening Unknown    Omega-3 Fatty Acids (FISH OIL MAXIMUM STRENGTH) 1200 MG CPDR Take 1 capsule by mouth  Unknown    traZODone (DESYREL) 50 MG tablet Take 100 mg by mouth nightly as needed for sleep Unknown    Turmeric 500 MG CAPS Take by mouth daily Unknown    zolpidem (AMBIEN) 10 MG tablet Take 10 mg by mouth nightly as needed for sleep Unknown

## 2024-08-09 NOTE — ED TRIAGE NOTES
Triage Assessment (Adult)       Row Name 08/09/24 1343          Triage Assessment    Airway WDL WDL        Respiratory WDL    Respiratory WDL WDL        Skin Circulation/Temperature WDL    Skin Circulation/Temperature WDL WDL        Cardiac WDL    Cardiac WDL WDL

## 2024-08-10 ENCOUNTER — TELEPHONE (OUTPATIENT)
Dept: BEHAVIORAL HEALTH | Facility: CLINIC | Age: 39
End: 2024-08-10
Payer: COMMERCIAL

## 2024-08-10 LAB — SARS-COV-2 RNA RESP QL NAA+PROBE: NEGATIVE

## 2024-08-10 PROCEDURE — 87635 SARS-COV-2 COVID-19 AMP PRB: CPT | Performed by: PSYCHIATRY & NEUROLOGY

## 2024-08-10 PROCEDURE — 96372 THER/PROPH/DIAG INJ SC/IM: CPT | Performed by: EMERGENCY MEDICINE

## 2024-08-10 PROCEDURE — 250N000011 HC RX IP 250 OP 636: Performed by: EMERGENCY MEDICINE

## 2024-08-10 PROCEDURE — 250N000013 HC RX MED GY IP 250 OP 250 PS 637: Performed by: EMERGENCY MEDICINE

## 2024-08-10 PROCEDURE — 250N000013 HC RX MED GY IP 250 OP 250 PS 637: Performed by: PSYCHIATRY & NEUROLOGY

## 2024-08-10 PROCEDURE — 99255 IP/OBS CONSLTJ NEW/EST HI 80: CPT | Performed by: NURSE PRACTITIONER

## 2024-08-10 PROCEDURE — 99418 PROLNG IP/OBS E/M EA 15 MIN: CPT | Performed by: NURSE PRACTITIONER

## 2024-08-10 RX ORDER — NICOTINE 21 MG/24HR
1 PATCH, TRANSDERMAL 24 HOURS TRANSDERMAL DAILY
Status: CANCELLED | OUTPATIENT
Start: 2024-08-10

## 2024-08-10 RX ORDER — DIPHENHYDRAMINE HYDROCHLORIDE 50 MG/ML
50 INJECTION INTRAMUSCULAR; INTRAVENOUS ONCE
Status: DISCONTINUED | OUTPATIENT
Start: 2024-08-10 | End: 2024-08-10

## 2024-08-10 RX ORDER — HYDROXYZINE HYDROCHLORIDE 25 MG/1
25 TABLET, FILM COATED ORAL EVERY 4 HOURS PRN
Status: CANCELLED | OUTPATIENT
Start: 2024-08-10

## 2024-08-10 RX ORDER — OLANZAPINE 10 MG/1
10 TABLET, ORALLY DISINTEGRATING ORAL ONCE
Status: COMPLETED | OUTPATIENT
Start: 2024-08-10 | End: 2024-08-10

## 2024-08-10 RX ORDER — AMOXICILLIN 250 MG
1 CAPSULE ORAL 2 TIMES DAILY PRN
Status: CANCELLED | OUTPATIENT
Start: 2024-08-10

## 2024-08-10 RX ORDER — ZOLPIDEM TARTRATE 5 MG/1
10 TABLET ORAL
Status: CANCELLED | OUTPATIENT
Start: 2024-08-10

## 2024-08-10 RX ORDER — LORAZEPAM 1 MG/1
2 TABLET ORAL EVERY 8 HOURS PRN
Status: CANCELLED | OUTPATIENT
Start: 2024-08-10

## 2024-08-10 RX ORDER — DIPHENHYDRAMINE HCL 25 MG
50 CAPSULE ORAL EVERY 8 HOURS PRN
Status: CANCELLED | OUTPATIENT
Start: 2024-08-10

## 2024-08-10 RX ORDER — HALOPERIDOL 5 MG/ML
5 INJECTION INTRAMUSCULAR ONCE
Status: COMPLETED | OUTPATIENT
Start: 2024-08-10 | End: 2024-08-10

## 2024-08-10 RX ORDER — LORAZEPAM 2 MG/ML
1 INJECTION INTRAMUSCULAR ONCE
Status: COMPLETED | OUTPATIENT
Start: 2024-08-10 | End: 2024-08-10

## 2024-08-10 RX ORDER — LORAZEPAM 2 MG/ML
2 INJECTION INTRAMUSCULAR EVERY 8 HOURS PRN
Status: CANCELLED | OUTPATIENT
Start: 2024-08-10

## 2024-08-10 RX ORDER — LORAZEPAM 1 MG/1
1 TABLET ORAL ONCE
Status: COMPLETED | OUTPATIENT
Start: 2024-08-10 | End: 2024-08-10

## 2024-08-10 RX ORDER — DIPHENHYDRAMINE HYDROCHLORIDE 50 MG/ML
50 INJECTION INTRAMUSCULAR; INTRAVENOUS EVERY 8 HOURS PRN
Status: CANCELLED | OUTPATIENT
Start: 2024-08-10

## 2024-08-10 RX ORDER — ACETAMINOPHEN 325 MG/1
650 TABLET ORAL EVERY 4 HOURS PRN
Status: CANCELLED | OUTPATIENT
Start: 2024-08-10

## 2024-08-10 RX ORDER — TRAZODONE HYDROCHLORIDE 50 MG/1
50 TABLET, FILM COATED ORAL
Status: CANCELLED | OUTPATIENT
Start: 2024-08-10

## 2024-08-10 RX ORDER — OLANZAPINE 10 MG/2ML
10 INJECTION, POWDER, FOR SOLUTION INTRAMUSCULAR 3 TIMES DAILY PRN
Status: CANCELLED | OUTPATIENT
Start: 2024-08-10

## 2024-08-10 RX ORDER — HALOPERIDOL 5 MG/ML
5 INJECTION INTRAMUSCULAR EVERY 8 HOURS PRN
Status: CANCELLED | OUTPATIENT
Start: 2024-08-10

## 2024-08-10 RX ORDER — OLANZAPINE 10 MG/1
10 TABLET ORAL 3 TIMES DAILY PRN
Status: CANCELLED | OUTPATIENT
Start: 2024-08-10

## 2024-08-10 RX ORDER — POLYETHYLENE GLYCOL 3350 17 G
2-4 POWDER IN PACKET (EA) ORAL
Status: CANCELLED | OUTPATIENT
Start: 2024-08-10

## 2024-08-10 RX ORDER — NICOTINE 21 MG/24HR
1 PATCH, TRANSDERMAL 24 HOURS TRANSDERMAL DAILY
Status: DISCONTINUED | OUTPATIENT
Start: 2024-08-10 | End: 2024-08-10

## 2024-08-10 RX ORDER — MAGNESIUM HYDROXIDE/ALUMINUM HYDROXICE/SIMETHICONE 120; 1200; 1200 MG/30ML; MG/30ML; MG/30ML
30 SUSPENSION ORAL EVERY 4 HOURS PRN
Status: CANCELLED | OUTPATIENT
Start: 2024-08-10

## 2024-08-10 RX ORDER — HALOPERIDOL 5 MG/1
5 TABLET ORAL EVERY 8 HOURS PRN
Status: CANCELLED | OUTPATIENT
Start: 2024-08-10

## 2024-08-10 RX ORDER — DIPHENHYDRAMINE HYDROCHLORIDE 50 MG/ML
50 INJECTION INTRAMUSCULAR; INTRAVENOUS ONCE
Status: COMPLETED | OUTPATIENT
Start: 2024-08-10 | End: 2024-08-10

## 2024-08-10 RX ADMIN — LORAZEPAM 1 MG: 1 TABLET ORAL at 02:32

## 2024-08-10 RX ADMIN — LORAZEPAM 1 MG: 2 INJECTION INTRAMUSCULAR; INTRAVENOUS at 14:37

## 2024-08-10 RX ADMIN — CALCIUM CARBONATE (ANTACID) CHEW TAB 500 MG 500 MG: 500 CHEW TAB at 21:30

## 2024-08-10 RX ADMIN — OLANZAPINE 20 MG: 20 TABLET, ORALLY DISINTEGRATING ORAL at 21:30

## 2024-08-10 RX ADMIN — OLANZAPINE 10 MG: 10 TABLET, ORALLY DISINTEGRATING ORAL at 10:26

## 2024-08-10 RX ADMIN — OLANZAPINE 10 MG: 10 TABLET, ORALLY DISINTEGRATING ORAL at 14:12

## 2024-08-10 RX ADMIN — HALOPERIDOL LACTATE 5 MG: 5 INJECTION, SOLUTION INTRAMUSCULAR at 14:32

## 2024-08-10 RX ADMIN — LITHIUM CARBONATE 1200 MG: 450 TABLET, EXTENDED RELEASE ORAL at 21:30

## 2024-08-10 RX ADMIN — OLANZAPINE 10 MG: 10 TABLET, ORALLY DISINTEGRATING ORAL at 08:46

## 2024-08-10 RX ADMIN — DIPHENHYDRAMINE HYDROCHLORIDE 50 MG: 50 INJECTION, SOLUTION INTRAMUSCULAR; INTRAVENOUS at 14:37

## 2024-08-10 RX ADMIN — TRAZODONE HYDROCHLORIDE 100 MG: 50 TABLET ORAL at 21:30

## 2024-08-10 NOTE — ED NOTES
7:15 am I received report on this patient and will take over care. He awake observed with even gait and balance. Observed.Talking to himself.8:15 am patient eating breakfast.8:45 am Covid test done.9:57 am patient continues to sing talk out loud and talk to himself hyper verbal and hyper religous.10:30 am patient on phone with his father sitting in main lounge area and calm.10:52 am patient in main lounge and watching TV but talking to himself yet.11:45 am patient continue to pee in cups in stated and I explained to use restroom. He stated he may have drink some of his urine not sure or not he stated.  I did not smell any urine in any cups. I got rid of all extra in room and he only has one cup with spite.MD in ED was notify of event if it even happened.Patient is so detached from reality.1:45 pm Patient laying down resting eyes closed chest rise and fall observed.2:00 patient awake again talking to himself out loud. 2:10 pm Patient acts out loud from anxiety MD was called.PRN medication given re -directed patient he now laying down now eyes open chest rise fall observed.2:40 pm patient given IM injections per orders total two injections one in each shoulder, patient tolerated.2:49 pm  patient more calm assessed resting in bed.

## 2024-08-10 NOTE — ED NOTES
Pt awake in room , responding to self  , and intermittently yelling out not related to anything. Occasionally pacing in milieu continuing to respond. Spit on floor as he is walking about. Instructed pt not to spit on floor and he agreed. 1800 Back to his room ; still responding and yelling out sometimes as earlier.

## 2024-08-10 NOTE — TELEPHONE ENCOUNTER
2:03 PM  Pt now on 72HH Initiated 8/10 @ 13:38. Per Provider Mei statio 12 can decide if pt needs 1:1. Intake called station 12 CRN Bee to give pt's MRN. Pt was placed in queue before call was made. Per Bee, a pod had to be closed so she will review pt to see if unit can accommodate. WCB.

## 2024-08-10 NOTE — TELEPHONE ENCOUNTER
R: [place]  Access Inpatient Bed Call Log 8/9/24 @ 3:24 PM:    Intake has called facilities that have not updated the bed status within the last 12 hours.                               [facility] is at capacity.           [facility] is posting 0 beds. [phone] Per call at 7:23am to [facility] no beds available for review.   [facility] is posting 0 beds. Negative covid required   [facility] is posting 0 beds. Neg covid. No high school/Jami-psych. [phone]. Per call at 7:24am to [place] currently FULL.   [place] is posting 0 beds. [phone]   [facility] is posting 2 beds. [phone]  NO beds currently available  [facility] is posting 0 beds. Ages 18-35. Negative covid. [phone].  Per call at 7:25 am LVM for callback regarding bed availability for all ages.   [facility] is posting 0 beds.    [facility] is posting 0 beds. [phone]         Pt remains on the work list pending appropriate bed availability.      6:49 PM PPS gave unit 12 Charge RN Pt to review but needs to be lower acuity due to unit acuity.      7:35 PM Unit 12 Charge RN Salena stated Pt is too highly acute for unit 12 tonight.  Can try again when unit is able to take high acuity Pts again
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
## 2024-08-10 NOTE — ED NOTES
38 yom received to BEC 3 . Flat w/ no eye contact. SI- no HI- ni SIB 0 Scratch myself and have a bandaid on it. . Pt easily off track in conversation and burst into song at one point. AVH - I do not see visions but sometimes , voices, smells, and taste but no touches. No street drugs. ETOH- Only socially up to 2 drinks. Sleep- 2 hours a day or so ago. Compliant w/ meds- Yes but they belong to the world. Mood - Stable , hungry , I am in an uppity mood. Triggers- - Past women . I am special flakita w/ specializes.     2100 During another code 21, this pt became agitated and sounded aggressive. Threww a cup of water at this nurse's head. Pt agreed to take 10 mg Zyprexa w/ hs dose. Dr Kauffman agreed. 9064 Pt came out of room w/ just his underwear on . He began touching himself but did stop after nurse instructed him to. Did put on scrubs again when asked. Saying bizarre things and  saying , Help me , Miracle , Help me.

## 2024-08-10 NOTE — ED NOTES
Slept and awakened on and off throughout the night. Ativan 1 mg given x1. Slept continuous for 1hr. See other meds prior to that. Observed self talking out loud intermittently, singing, disrobed at one point, voided on the carpet in his room and out an in front of his door. Urinal container in place. Patients needs frequent redirection. Otherwise, no outburst. Staff to continue to assess/monitor.  0700: Hours of sleep=2.5 hrs

## 2024-08-10 NOTE — TELEPHONE ENCOUNTER
R:  [place]  Access Inpatient Bed Call Log 8/10/24 @ 12AM  Intake has called facilities that have not updated their bed status within the last 12 hours.     [facility]: No appropriate beds available  [facility]: @ cap per website. Per Jose @ 12:01AM, they are full and have waiting list  [facility]: @ cap per website   [facility]: @ cap per website   [facility]: @ cap per website   [facility]: Posting 2 beds. No answer @ 12:02AM. Per Mehnaz @ 12:27AM, bed available - requires CBC, CMP, Tylenol, Aspirin, UA, UDS, BAL, TSH and negative Covid before they can review - Covid test not collected at this time     [facility]: @ cap per website (Young Adult unit: No recent aggressions, violence or sexual assault. Neg covid required).     [facility]: @ cap per website   [facility]: @ cap per website   [facility]: @ cap per website     Pt remains on work list until appropriate placement is available

## 2024-08-10 NOTE — TELEPHONE ENCOUNTER
12:29 PM  Paged on call provider Mei to review pt for station 12. Per provider, she would like to see pt on a hold before going on the unit. Intake called [facility] and asked RN caring for pt, Jayesh to ask Dr. [facility]HH. Per Jayesh, pt did NOT have code called on him and has NOT needed a 1:1.

## 2024-08-11 ENCOUNTER — TELEPHONE (OUTPATIENT)
Dept: BEHAVIORAL HEALTH | Facility: CLINIC | Age: 39
End: 2024-08-11
Payer: COMMERCIAL

## 2024-08-11 PROCEDURE — 250N000013 HC RX MED GY IP 250 OP 250 PS 637: Performed by: EMERGENCY MEDICINE

## 2024-08-11 PROCEDURE — 250N000013 HC RX MED GY IP 250 OP 250 PS 637: Performed by: PSYCHIATRY & NEUROLOGY

## 2024-08-11 PROCEDURE — 99233 SBSQ HOSP IP/OBS HIGH 50: CPT | Performed by: NURSE PRACTITIONER

## 2024-08-11 PROCEDURE — 99418 PROLNG IP/OBS E/M EA 15 MIN: CPT | Performed by: NURSE PRACTITIONER

## 2024-08-11 PROCEDURE — 250N000013 HC RX MED GY IP 250 OP 250 PS 637: Performed by: NURSE PRACTITIONER

## 2024-08-11 RX ORDER — HALOPERIDOL 5 MG/1
5 TABLET ORAL ONCE
Status: COMPLETED | OUTPATIENT
Start: 2024-08-11 | End: 2024-08-11

## 2024-08-11 RX ORDER — HALOPERIDOL 5 MG/1
5 TABLET ORAL 2 TIMES DAILY PRN
Status: DISCONTINUED | OUTPATIENT
Start: 2024-08-11 | End: 2024-08-13

## 2024-08-11 RX ADMIN — CALCIUM CARBONATE (ANTACID) CHEW TAB 500 MG 500 MG: 500 CHEW TAB at 16:57

## 2024-08-11 RX ADMIN — NICOTINE POLACRILEX 2 MG: 2 GUM, CHEWING BUCCAL at 07:46

## 2024-08-11 RX ADMIN — HALOPERIDOL 5 MG: 5 TABLET ORAL at 08:09

## 2024-08-11 RX ADMIN — HALOPERIDOL 5 MG: 5 TABLET ORAL at 12:01

## 2024-08-11 RX ADMIN — LITHIUM CARBONATE 1200 MG: 450 TABLET, EXTENDED RELEASE ORAL at 20:54

## 2024-08-11 RX ADMIN — TRAZODONE HYDROCHLORIDE 100 MG: 50 TABLET ORAL at 20:55

## 2024-08-11 RX ADMIN — OLANZAPINE 10 MG: 10 TABLET, ORALLY DISINTEGRATING ORAL at 07:46

## 2024-08-11 RX ADMIN — OLANZAPINE 10 MG: 10 TABLET, ORALLY DISINTEGRATING ORAL at 14:42

## 2024-08-11 RX ADMIN — OLANZAPINE 20 MG: 20 TABLET, ORALLY DISINTEGRATING ORAL at 20:55

## 2024-08-11 RX ADMIN — NICOTINE POLACRILEX 4 MG: 2 GUM, CHEWING BUCCAL at 06:12

## 2024-08-11 RX ADMIN — HALOPERIDOL 5 MG: 5 TABLET ORAL at 16:56

## 2024-08-11 NOTE — ED NOTES
7:15 am am I received  on this patient and will take over care. Patient requested PRN for anxiety and agitation. Awake  alert x 4 can voice his needs, but still talks to himself always yet.10:30 am security has copy of 72 hold and also one put in patient file in BEC unit.12:02 pm patient still talking to himself. PRN given per MD.Patient appears calm and interacting with me and requested his TV to be on, eating lunch.12:46 pm patient yelling in back he was mad the toilet flushed by itself. The patient is manic and dis organized thoughts and will not stabilized his mood,he does take med's ok and dis not refuse but still talks to himself and yells for no real reason and states I'm sorry.2:40 pm patient was talking to brother on the phone it got heated and he throw the phone across the room.2:42 pm patient was given PRN for agitation and anxiety. Patient requested Bible and one was given to him. Patient has been observed to short fuse and acts out several times on the unit and has explosive outbursts that last 30 seconds and then back to some what calm.Continue with care.

## 2024-08-11 NOTE — ED NOTES
Patient slept most of the night this shift. .Awakened x3. Initially to the restroom with standby assist. Later briefly for fluids and to the restroom again. Needs redirection singing out loud at one point, otherwise, redirectable. No major behavior issue. Staff to continue to assess/observe.

## 2024-08-11 NOTE — CONSULTS
"Adult Psychiatry Consultation     Ángel Bardales MRN# [id number]   Age: 38 year old YOB: 1985   Date of Admission to ED: 8/9/2024    In person visit Details:     Patient was assessed and interviewed face-to-face in person with this writer   Assessment methods included conducting a formal interview with patient, review of medical records, collaboration with medical staff, and obtaining relevant collateral information from family and community providers when available.      DOILIA Conti CNP            Contacts:   Attending Physician: Mirta Black MD     Current Outpatient Psychiatrist: Jose Maria Adhikari DNP, [facility],     Primary Care Provider: [facility]  Family/friends/guardian: Brother/Fabián Bardales [phone]  Family/friends/guardian: Father/Eric Prabhakar) Jeffy [phone]  Therapist: none  : none           Subjective:     Ángel was agreeable to meet when writer knocked on his door.  He reported his mood was okay. He rated depression 1/10, anxiety 5/10 and denied anger and SI on a scale with 10 as the worst.  He reported his anxiety is experienced as both racing thoughts and physical symptoms.  He asked \"do smelling armpits mean your anxious?'  He denies SI/HI/AH/VH.  He mumbled some of the time and was not willing to repeat what he said.  He reports his sleep and appetite have been good. He denies problems with elimination.  He was disorganized when talking. At one point he said, \"get together will all your head and talk about it.\" He grew agtitated near the end of the interview and started playing the drums on his belly.           Objective:     Ángel was lying in bed when writer knocked on his door and asked to check in.  He was bare chested but had covers over his body from the waist down.  He appeared to be answering questions appropriately at first.  He became more difficult to understand as writer interacted with him. His " "agitation increased through out the interview.  He was disorganized in speech and talked over writer often.     Per staff notes:   Per Miracle Diggs RN  ED note on 8/11/2024:   \"Pt asleep on early rounds.. Adolescents on the BEC were speaking rather loudly and woke him up. He stated a few minutes later that they were talking about porn and he stated some nonsensical speech about how it works w/ [employer] ( [employer]  ) Then stated they need to be educated about how to do  sex properly. ( Pt eems to have overheard conversations enter to his internal responding and moves from subject to subject. Spoke w/ Dr. Sauceda and Haldol 5 mg po given x 1 dose. 1800 pt continues to mumble to self and letting his trousers slide down over and over . It was suggested he may be mooning the others but this nurse thinks he has had a hard time keeping his trousers up.   Pt to be transferred back to the ED Adult.  Report called to receiving nurse , Dinner taken well and pt transferred back to the ED via  w/  and Staff. Pt agreed to go.\"    PRNs:  8/10/2024  0232 Ativan 1 mg po  08/10/2024  1026 Zypexa ODT 10 mg  8/10/2024 1432 Haldol 5 mg IM, Ativan 1 mg IM and Benadryl 50 mg IM   8/11/2024  0809  Haldol 5 mg PO  8/11/2024  1201 Haldol 5 mg PO  8/11/2024  1656 Haldol 5 mg PO    Seclusion and Restraints: No S/R orders since admission.     Medications: Patient is restarting his medications.     Current Facility-Administered Medications   Medication Dose Route Frequency Provider Last Rate Last Admin    calcium carbonate (TUMS) chewable tablet 500 mg  500 mg Oral Daily PRN Ramy Kauffman MD   500 mg at 08/10/24 2130    haloperidol (HALDOL) tablet 5 mg  5 mg Oral BID PRN Mirta Black MD   5 mg at 08/11/24 0809    lithium ER (LITHOBID) CR tablet 1,200 mg  1,200 mg Oral At Bedtime Ramy Kauffman MD   1,200 mg at 08/10/24 2130    nicotine (NICORETTE) gum 2-4 mg  2-4 mg Buccal Q1H PRN Gurvinder Hurley, " APRN CNP   2 mg at 08/11/24 0746    OLANZapine (zyPREXA) injection 10 mg  10 mg Intramuscular BID PRN Ramy Kauffman MD        Or    OLANZapine zydis (zyPREXA) ODT tab 10 mg  10 mg Oral BID PRN Ramy Kauffman MD   10 mg at 08/11/24 0746    OLANZapine zydis (zyPREXA) ODT tab 20 mg  20 mg Oral At Bedtime Ramy Kauffman MD   20 mg at 08/10/24 2130    traZODone (DESYREL) tablet 100 mg  100 mg Oral At Bedtime Ramy Kauffman MD   100 mg at 08/10/24 2130     Current Outpatient Medications   Medication Sig Dispense Refill    lithium ER (LITHOBID) 300 MG CR tablet Take 1,200 mg by mouth daily      melatonin 3 MG tablet Take by mouth at bedtime      Milk Thistle-Dand-Fennel-Licor (MILK THISTLE XTRA) CAPS capsule Take by mouth daily      OLANZapine zydis (ZYPREXA) 5 MG ODT Take 5 mg by mouth See Admin Instructions Take 1 tablet (5 mg) in the morning and 2 tablets (10 mg) in the evening      Omega-3 Fatty Acids (FISH OIL MAXIMUM STRENGTH) 1200 MG CPDR Take 1 capsule by mouth      traZODone (DESYREL) 50 MG tablet Take 100 mg by mouth nightly as needed for sleep      Turmeric 500 MG CAPS Take by mouth daily      zolpidem (AMBIEN) 10 MG tablet Take 10 mg by mouth nightly as needed for sleep                Collateral information from chart review and phone calls:     Reviewed DEC assessment, Initial ED consult, and ED notes.      Collateral information from the famly/friend:     Father/Eric (Selene) Jeffy [phone] - not called.     Brother/Fabián Bardales [phone]   10:59.  No answer, left vm with call back number               12:29  12:55 (26 minutes)     Fabián reports that Ángel is living with dad.  They do okay together.  Dad is also bipolar.  Dad is medicated (takes only lithium)  and stable.  What seems to happen with Ángel:  he will be hospitalized and get more stable, will be released. He returns home. He stops taking his meds after he feels good for a few weeks and stop taking them.  The other  thing that happens is he will get confused and not take the medication as prescribed.  He is not doing this intentionally.  Dad works in the evening.  He is not there watching Ángel full time. They live in [place]. He does not have a [facility].     Fabián/brother reports Ángel needs a very simple life.  He should not work too much as he decompensates.  Full time work is too much.     Baseline for Ángel:  basically be able to complete a thought or complete a sentence.  When he is stable he is able to hold a job.  He was stable for 6 months before this happened.  This cycle of being unstable was started due to his off and on health insurance (related to not being able to work at any one job for more than 6 months) and being able to afford medications.  At one point he was taking lithium that he bought OTC, because he could afford the prescription.  The OTC lithium is no where need the strength of his prescription.  He is now on MA - starting in July 2024.  Ángel often does not remember to get his medications refilled. He will stop taking medication because he runs out.     He has a lot of paranoia. Yesterday he called his brother saying that the staff were not letting him use the bathroom and were going to put a catheter.  In the past he has accused the nurses of taking his money and phone.     Signs he is not doing well:   The manipulation, rudeness, and aggression are signs of decompensation. Also is grandiose about working, buying houses, etc. If he is seeing or hearing things, unable to finish a thought or speak a complete sentence he is not doing well.  Rhyming/sing song = not stable.  Reminiscing too much about his childhood.     At baseline:  Ángel is calm and nice to others.  Minds his manners.  Helpful and friendly.      Ángel has other health issues - gallbladder was removed, prediabetic - taking metformin. He wears eyeglasses.         Mental Status Exam:   Appearance:  awake, alert, when in room he  appeared bare chested with covers over him. In the milieu he was were scrubs.   Attitude:  less cooperative  Eye Contact:   limited  Mood:  anxious  Affect:  frustrated and irritable  Speech:  clear, coherent and paucity of speech  Psychomotor Behavior:  no evidence of tardive dyskinesia, dystonia, or tics  Thought Process:  disorganized  Associations:  loosening of associations present  Thought Content:  no evidence of suicidal ideation or homicidal ideation and disorganization,   Insight:  limited  Judgment:  limited  Oriented to:   person  Attention Span and Concentration:  poor  Recent and Remote Memory:  poor  Fund of Knowledge: low-normal  Muscle Strength and Tone: normal  Gait and Station: Normal         Physical Exam:     BP (!) 129/98   Pulse 111   Temp 98.3  F (36.8  C) (Oral)   Resp 18   Wt 134.9 kg (297 lb 4.8 oz)   SpO2 94%   BMI 45.21 kg/m    Weight is 297 lbs 4.8 oz Data Unavailable Body mass index is 45.21 kg/m .      Laboratory/Imaging:    Recent Results (from the past 72 hour(s))   Urine Drug Screen Panel    Collection Time: 08/09/24  2:25 PM   Result Value Ref Range    Amphetamines Urine Screen Negative Screen Negative    Barbituates Urine Screen Negative Screen Negative    Benzodiazepine Urine Screen Negative Screen Negative    Cannabinoids Urine Screen Negative Screen Negative    Cocaine Urine Screen Negative Screen Negative    Fentanyl Qual Urine Screen Negative Screen Negative    Opiates Urine Screen Negative Screen Negative    PCP Urine Screen Negative Screen Negative   CBC with platelets and differential    Collection Time: 08/09/24  3:33 PM   Result Value Ref Range    WBC Count 11.9 (H) 4.0 - 11.0 10e3/uL    RBC Count 4.76 4.40 - 5.90 10e6/uL    Hemoglobin 14.2 13.3 - 17.7 g/dL    Hematocrit 41.3 40.0 - 53.0 %    MCV 87 78 - 100 fL    MCH 29.8 26.5 - 33.0 pg    MCHC 34.4 31.5 - 36.5 g/dL    RDW 12.6 10.0 - 15.0 %    Platelet Count 243 150 - 450 10e3/uL    % Neutrophils 66 %    %  Lymphocytes 26 %    % Monocytes 5 %    % Eosinophils 2 %    % Basophils 1 %    % Immature Granulocytes 0 %    NRBCs per 100 WBC 0 <1 /100    Absolute Neutrophils 7.9 1.6 - 8.3 10e3/uL    Absolute Lymphocytes 3.1 0.8 - 5.3 10e3/uL    Absolute Monocytes 0.6 0.0 - 1.3 10e3/uL    Absolute Eosinophils 0.2 0.0 - 0.7 10e3/uL    Absolute Basophils 0.1 0.0 - 0.2 10e3/uL    Absolute Immature Granulocytes 0.0 <=0.4 10e3/uL    Absolute NRBCs 0.0 10e3/uL   Lithium level    Collection Time: 08/09/24  3:39 PM   Result Value Ref Range    Lithium 1.08 0.60 - 1.20 mmol/L   Comprehensive metabolic panel    Collection Time: 08/09/24  4:14 PM   Result Value Ref Range    Sodium 136 135 - 145 mmol/L    Potassium 3.5 3.4 - 5.3 mmol/L    Carbon Dioxide (CO2) 24 22 - 29 mmol/L    Anion Gap 14 7 - 15 mmol/L    Urea Nitrogen 12.3 6.0 - 20.0 mg/dL    Creatinine 0.83 0.67 - 1.17 mg/dL    GFR Estimate >90 >60 mL/min/1.73m2    Calcium 10.5 (H) 8.8 - 10.4 mg/dL    Chloride 98 98 - 107 mmol/L    Glucose 170 (H) 70 - 99 mg/dL    Alkaline Phosphatase 75 40 - 150 U/L    AST 25 0 - 45 U/L    ALT 39 0 - 70 U/L    Protein Total 7.8 6.4 - 8.3 g/dL    Albumin 4.7 3.5 - 5.2 g/dL    Bilirubin Total 0.6 <=1.2 mg/dL   Asymptomatic COVID-19 Virus (Coronavirus) by PCR Nasopharyngeal    Collection Time: 08/10/24  8:40 AM    Specimen: Nasopharyngeal; Swab   Result Value Ref Range    SARS CoV2 PCR Negative Negative       ROS: 10 point ROS neg other than the symptoms noted above in the subjective.     I have reviewed the physical done by ED provider on 8/9/2024. Notable changes include: none              Impression:     This patient is a 38 year old year old  male with a past psychiatric history of  ADHD and bipolar 1 disorder, who presented to the ED on 8/9/2024  for psychosis and priti.  Prior to presenting to the ED patient had not been taking medications as prescribed.     Today, Ángel was less hostile and insulting.  He was disorganized when writer was meeting  with him.  Later in the day, he greeted writer by writer's first name and seemed to be seeking validation.  Writer greeted him kindly.  Later, per RN notes he became irritated with the youth on the unit and had to be transferred back to the adult ED.     Current medications are listed above. Patients most recent medication regimen was restarted in the ED by the ED provider, recommend continuing that regimen.  Will attempt to reach patient's brother or father tomorrow for additional details.  Patient was put on a 72 hour hold this afternoon due to his disorganization and irritability.   Acute inpatient stabilization is needed as indicated by patient inability to function and need for safety and stabilization.  .  Other recommendations are listed below.       Risk for harm is elevated.    Brief Therapeutic Intervention(s):  Provided rappport building, active listening, unconditional positive regard, and validation.    Legal Status: 72-h Hold signed on 8/10/2024 at 13:38            Diagnoses:     Principal Diagnosis: Bipolar 1 disorder, mixed episode with psychosis     Secondary psychiatric diagnoses of concern this admission:  none    Current medical diagnosis being treated:   none         Recommendations:     Discussed the case and writer's recommendations with Dr Sona Harman MD, ED provider    Recommend acute inpatient stabilization based on  patient inability to function and need for safety and stabilization.  2.   Most recent medication regimen was restarted in the ED -   3.   Continue:               Medications as listed above.   4.   Needs referral to [facility].   5.  Needs assistance with medication - is not able to manage taking them on his own.    6.   Continue to consult psychiatry as needed.     Attestation:  Patient time: 15 minutes  Reviewed labs, EKG, and relevant imagin minutes  Family/guardian/other time: 26 minutes  Team/BHP/ED/EC staff time: 20 minutes  Chart review: 30 minutes  Documentation:  35 minutes  Total time: 126 minutes spent on the date of the encounter.     I have provided critical care services at the bedside in the [facility] behavioral emergency [place], evaluating the patient, reviewing notes and laboratory values and directing care. I have discussed recommendation regarding whether or not hospitalization is needed and recommendations for medications and laboratory testing with the attending emergency department provider. Krystal Wyatt, DNP, APRN, CNP August 11, 2024.    Disclaimer: This note consists of symbols derived from keyboarding, dictation, and/or voice recognition software. As a result, there may be errors in the script that have gone undetected.  Please consider this when interpreting information found in the chart.    Please call [facility]/DEC at [phone] if you have follow-up questions or wish to place another consult.

## 2024-08-11 NOTE — ED PROVIDER NOTES
[facility] ED Mental Health Handoff Note:       Brief HPI:  This is a 38 year old male signed out to me by Dr. Jaramillo.  See initial ED Provider note for full details of the presentation. Being admitted for decompensated bipolar.     Home meds reviewed and ordered/administered: Yes    Medically stable for inpatient mental health admission: Yes.    Evaluated by mental health: Yes. The recommendation is for inpatient mental health treatment. Bed search in process    Safety concerns: At the time I received sign out, there were no safety concerns.    Hold Status:  Active Orders   Legal    Emergency Hospitalization Hold (72 Hr Hold)     Frequency: Effective Now     Start Date/Time: 08/10/24 1338      Number of Occurrences: Until Specified         Exam:   No data found.        ED Course:    Medications   OLANZapine zydis (zyPREXA) ODT tab 20 mg (20 mg Oral $Given 8/10/24 2130)   lithium ER (LITHOBID) CR tablet 1,200 mg (1,200 mg Oral $Given 8/10/24 2130)   traZODone (DESYREL) tablet 100 mg (100 mg Oral $Given 8/10/24 2130)   OLANZapine (zyPREXA) injection 10 mg ( Intramuscular See Alternative 8/11/24 0746)     Or   OLANZapine zydis (zyPREXA) ODT tab 10 mg (10 mg Oral $Given 8/11/24 0746)   calcium carbonate (TUMS) chewable tablet 500 mg (500 mg Oral $Given 8/10/24 2130)   nicotine (NICORETTE) gum 2-4 mg (2 mg Buccal $Given 8/11/24 0746)   haloperidol (HALDOL) tablet 5 mg (5 mg Oral $Given 8/11/24 0809)   LORazepam (ATIVAN) tablet 1 mg (1 mg Oral $Given 8/10/24 0232)   OLANZapine zydis (zyPREXA) ODT tab 10 mg (10 mg Oral $Given 8/10/24 1026)   haloperidol lactate (HALDOL) injection 5 mg (5 mg Intramuscular $Given 8/10/24 1432)   LORazepam (ATIVAN) injection 1 mg (1 mg Intramuscular $Given 8/10/24 1437)   diphenhydrAMINE (BENADRYL) injection 50 mg (50 mg Intramuscular $Given 8/10/24 1437)            There were no significant events during my shift.    Patient was signed out to the oncoming provider,   Kimani.      Impression:    ICD-10-CM    1. Bipolar affective disorder, currently manic, moderate (H)  F31.12           Plan:    Awaiting inpatient mental health admission/transfer.      RESULTS:   Results for orders placed or performed during the hospital encounter of 08/09/24 (from the past 24 hour(s))   Asymptomatic COVID-19 Virus (Coronavirus) by PCR Nasopharyngeal     Status: Normal    Collection Time: 08/10/24  8:40 AM    Specimen: Nasopharyngeal; Swab   Result Value Ref Range    SARS CoV2 PCR Negative Negative    Narrative    Testing was performed using the Xpert Xpress SARS-CoV-2 Assay on the Cepheid Gene-Xpert Instrument Systems. Additional information about this Emergency Use Authorization (EUA) assay can be found via the Lab Guide. This test should be ordered for the detection of SARS-CoV-2 in individuals who meet SARS-CoV-2 clinical and/or epidemiological criteria as well as from individuals without symptoms or other reasons to suspect COVID-19. Test performance for asymptomatic patients has only been established in anterior nasal swab specimens. This test is for in vitro diagnostic use under the FDA EUA for laboratories certified under CLIA to perform high complexity testing. This test has not been FDA cleared or approved. A negative result does not rule out the presence of PCR inhibitors in the specimen or target RNA concentration below the limit of detection for the assay. The possibility of a false negative should be considered if the patient's recent exposure or clinical presentation suggests COVID-19. This test was validated by the [facility]. This laboratory is certified under the Clinical Laboratory Improvement Amendments (CLIA) as qualified to perform high complexity laboratory testing.               MD Wayne Garcia Cara, MD  08/11/24 0888

## 2024-08-11 NOTE — TELEPHONE ENCOUNTER
00:11 - Spoke to 12 DYLAN Basurto, who reports they are unable to accommodate d/t pt acuity.     00:14- Paged ANS    00:41 - Spoke to Alcides ESTRADA, who is in agreement that admit to 12NB to be cancelled and PPS to seek alternative placement. Removed pt from unit queue    00:48 - Updated BEC RN Alcides that admission to 12NB cancelled and PPS seeking alternative placement    R:  [place]  Access Inpatient Bed Call Log 8/10/24 @ 11:30PM:  Intake has called facilities that have not updated their bed status within the last 12 hours.     [facility]: @ cap  [facility]: @ cap per website. Per Renea @ 11:43PM, they are full and have a wait list    [facility]: @ cap per website   [facility]: @ cap per website. Per Chelsea @ 11:44PM, they are full   [facility]: @ cap per website   [facility]: Posting 2 beds. No answer @ 11:44PM    [facility]: @ cap per website (Young Adult unit: No recent aggressions, violence or sexual assault. Neg covid required).     [facility]: @ cap per website   [facility]: @ cap per website   [facility]: Posting 1 bed. Per Latonna @ 11:48PM, they are at [facility]: Posting 6 beds. Mixed unit/Low acuity only. Pt not appropriate d/t acuity  [facility]: Posting 1 bed.  Per Latonna @ 11:48PM, they are at [facility]: @ cap per website   [facility]: Posting 2 beds. Per Latonna @ 11:48PM, they are at [facility]: Does not review after 10PM.     [facility]: Posting 1 bed. Per Marco Antonio @ 11:50PM, at cap [facility]: Posting 2 beds.  Per Marco Antonio @ 11:50PM, at cap [facility]: Posting 2 beds.  Per Marco Antonio @ 11:50PM, 2 low acuity beds  [facility]: Posting 3 beds (No hx of aggression. No sexual offenders. Voluntary patients only). Meets exclusionary d/t 72 HH  [facility]: Posting 2 beds (Always low acuity only. Must have the cognitive ability to do programming. No aggressive or violent behavior  or recent HX in the last 2 yrs. MH must be primary). Pt not appropriate d/t acuity  [facility]: @ cap per website   [facility]: Posting 2 beds (Low acuity, Neg Covid). Per Bridgette @ 11:52PM, no beds tonight  [facility]: Posting 7 beds. Per Irina @ 11:52PM, 4 [place]/Low acuity beds  [facility]: Posting 3 beds (No hx of aggression/assault. No lines, drains or tubes. Does not provide detox or CD treatment. Must have confirmed ride home upon discharge).   [facility]: Posting 22 beds. [place]. Per Intake policy, patients must [place] voluntary for placement in [place]   [facility]: Posting 5 beds (Mixed unit/Low acuity). Per Chelsea @ 1:05AM, no high acuity beds available         Pt remains on work list until appropriate placement is available

## 2024-08-11 NOTE — TELEPHONE ENCOUNTER
R: [place]  Access Inpatient Bed Call Log 8/11/2024 2:51 PM   Intake has called facilities that have not updated their bed status within the last 12 hours.??         [facility]   [place]: @ [facility] per website.   [facility]:  Posting 0 beds. [phone], APS:[phone] ECT   [facility]: Posting 0 beds. ECT Tx offered.        [place]:  Posting 0 beds.   [facility]: Posting 2 beds. [phone] ECT Tx offered.   [place] [facility]: Posting 0 beds.   [place] -- [facility] Posting 0 beds. [phone]     [place] -- [facility]:  Posting 1 bed. Low acuity only. [phone]      Pt remains on worklist pending appropriate bed availability.

## 2024-08-11 NOTE — TELEPHONE ENCOUNTER
R: spoke to RN on Unit 12 stating they are case by case for admissions and pt should have been reviewed with the unit before being added to unit queue.  RN stated she will need to review pt further and will contact intake with an update or cancel admit with provider if necessary.  Awaiting call back.  ANS notified of pt in queue for unit 12 in 5 PM bed meeting.  I let ANS know I would be in contact with unit 12 to see if they are able to take admission due to unit acuity and being case by case for admissions    Unit 12 CRN reports if pt continues to take medications and continues to be appropriate they may be able to take him.  CRN reports pt was declined yesterday by CRN due to acuity and were only able to take a low acuity admit.  CRN reports pt can remain in queue and will follow up with intake after reviewing further.    10:20 PM called unit 12 for an update.  CRN reports acuity is high on the unit but pt may be appropriate if he continues to improve in the ED.  CRN stated she will need to pass pt on to night shift and pt can remain in queue  at this time.    10:40 PM spoke to ANS who reports if unit 12 is OK with pt remaining in queue, leave in queue for now and follow up with unit on nights.  Handed off to nights to follow up with unit 12.

## 2024-08-11 NOTE — ED NOTES
Pt asleep on early rounds.. Adolescents on the BEC were speaking rather loudly and woke him up. He stated a few minutes later that they were talking about porn and he stated some nonsensical speech about how it works w/ [employer] [facility] [employer]  ) Then stated they need to be educated about how to do  sex properly. ( Pt eems to have overheard conversations enter to his internal responding and moves from subject to subject. Spoke w/ Dr. Sauceda and Haldol 5 mg po given x 1 dose. 1800 pt continues to mumble to self and letting his trousers slide down over and over . It was suggested he may be mooning the others but this nurse thinks he has had a hard time keeping his trousers up.     Pt to be transferred back to the ED Adult.  Report called to receiving nurse , Dinner taken well and pt transferred back to the ED via  w/  and Staff. Pt agreed to go.

## 2024-08-12 ENCOUNTER — TELEPHONE (OUTPATIENT)
Dept: BEHAVIORAL HEALTH | Facility: CLINIC | Age: 39
End: 2024-08-12
Payer: COMMERCIAL

## 2024-08-12 ENCOUNTER — MEDICAL CORRESPONDENCE (OUTPATIENT)
Dept: HEALTH INFORMATION MANAGEMENT | Facility: CLINIC | Age: 39
End: 2024-08-12
Payer: COMMERCIAL

## 2024-08-12 PROCEDURE — 250N000013 HC RX MED GY IP 250 OP 250 PS 637: Performed by: NURSE PRACTITIONER

## 2024-08-12 PROCEDURE — 250N000013 HC RX MED GY IP 250 OP 250 PS 637: Performed by: EMERGENCY MEDICINE

## 2024-08-12 PROCEDURE — 250N000013 HC RX MED GY IP 250 OP 250 PS 637: Performed by: PSYCHIATRY & NEUROLOGY

## 2024-08-12 PROCEDURE — 99233 SBSQ HOSP IP/OBS HIGH 50: CPT | Performed by: NURSE PRACTITIONER

## 2024-08-12 PROCEDURE — 99207 PR NO CHARGE LOS: CPT | Performed by: NURSE PRACTITIONER

## 2024-08-12 PROCEDURE — 99418 PROLNG IP/OBS E/M EA 15 MIN: CPT | Performed by: NURSE PRACTITIONER

## 2024-08-12 RX ORDER — ACETAMINOPHEN 325 MG/1
650 TABLET ORAL EVERY 6 HOURS PRN
Status: DISCONTINUED | OUTPATIENT
Start: 2024-08-12 | End: 2024-08-13

## 2024-08-12 RX ORDER — SALIVA STIMULANT COMB. NO.3
2 SPRAY, NON-AEROSOL (ML) MUCOUS MEMBRANE 4 TIMES DAILY PRN
Status: DISCONTINUED | OUTPATIENT
Start: 2024-08-12 | End: 2024-08-22 | Stop reason: HOSPADM

## 2024-08-12 RX ORDER — METFORMIN HCL 500 MG
500 TABLET, EXTENDED RELEASE 24 HR ORAL
Status: DISCONTINUED | OUTPATIENT
Start: 2024-08-12 | End: 2024-08-22 | Stop reason: HOSPADM

## 2024-08-12 RX ORDER — METFORMIN HCL 500 MG
TABLET, EXTENDED RELEASE 24 HR ORAL
Status: ON HOLD | COMMUNITY
Start: 2024-08-08 | End: 2024-08-21

## 2024-08-12 RX ORDER — PROPRANOLOL HYDROCHLORIDE 10 MG/1
10 TABLET ORAL 3 TIMES DAILY PRN
Status: DISCONTINUED | OUTPATIENT
Start: 2024-08-12 | End: 2024-08-14

## 2024-08-12 RX ADMIN — ACETAMINOPHEN 650 MG: 325 TABLET, FILM COATED ORAL at 08:47

## 2024-08-12 RX ADMIN — NICOTINE POLACRILEX 2 MG: 2 GUM, CHEWING BUCCAL at 14:25

## 2024-08-12 RX ADMIN — NICOTINE POLACRILEX 2 MG: 2 GUM, CHEWING BUCCAL at 06:12

## 2024-08-12 RX ADMIN — NICOTINE POLACRILEX 2 MG: 2 GUM, CHEWING BUCCAL at 08:47

## 2024-08-12 RX ADMIN — PROPRANOLOL HYDROCHLORIDE 10 MG: 10 TABLET ORAL at 12:09

## 2024-08-12 RX ADMIN — OLANZAPINE 20 MG: 20 TABLET, ORALLY DISINTEGRATING ORAL at 22:47

## 2024-08-12 RX ADMIN — NICOTINE POLACRILEX 2 MG: 2 GUM, CHEWING BUCCAL at 13:17

## 2024-08-12 RX ADMIN — METFORMIN HYDROCHLORIDE 500 MG: 500 TABLET, EXTENDED RELEASE ORAL at 18:03

## 2024-08-12 RX ADMIN — HALOPERIDOL 5 MG: 5 TABLET ORAL at 01:14

## 2024-08-12 RX ADMIN — OLANZAPINE 10 MG: 10 TABLET, ORALLY DISINTEGRATING ORAL at 04:29

## 2024-08-12 RX ADMIN — LITHIUM CARBONATE 1200 MG: 450 TABLET, EXTENDED RELEASE ORAL at 22:46

## 2024-08-12 NOTE — TELEPHONE ENCOUNTER
R: [place]  Access Inpatient Bed Call Log 8/11/24 @3:08 PM:    Intake has called facilities that have not updated the bed status within the last 12 hours.                               [facility] is 0 beds.          [facility] is posting 0 beds. [phone]; per call at 7:02 am to [facility], they are at cap.    [facility] is posting 0 beds. Negative covid required   [facility] is posting 0 beds. Neg covid. No high school/Jami-psych. [phone]. per call at 7:03 am to [facility], they are at cap.    [facility] is posting 0 beds. [phone]   [facility] is posting 0 beds. [phone]    [facility] is posting 0 beds. Ages 18-35. Negative covid. [phone]. Per call at 7:05 am, left a  asking for a call back re: bed availability.    [facility] is posting 0 beds.    [facility] is posting 1 bed. [phone]       Pt remains on the work list pending appropriate bed availability.

## 2024-08-12 NOTE — TELEPHONE ENCOUNTER
R: 32/Jory  12:42 PM [place], unit 32 Charge RN updated that they are still working on staffing for the SIO so Pt will be admitted onto unit after shift change.    12:45 PM Updated [facility] ED on the delay in admit

## 2024-08-12 NOTE — ED NOTES
Pt attempted to elope, posturing and throwing his journal on the ground. He was able to be re-directed and take bedtime medications at 2055.

## 2024-08-12 NOTE — ED NOTES
Pt asking if he can leave today, informed that he is up for admission and on wait list. Wondering if his father knows that he is here, assured his father is aware since his father brought him here yesterday. Pt resting in HW, talking to self.

## 2024-08-12 NOTE — ED NOTES
"Up to use restroom. After restroom, became agitated because he wanted lithium every 2 hours and writer explained safe lithium dosing and stated that was not possible. Patient began pacing, spilled water on himself, calling writer a \"bitch, bitch, bitch\" at increasing volume. Stated writer is trying to kill him and he knows more than her. Refuted that he was on a 72 hour hold, and then he began inattentively singing songs. Available medication offered for agitation instead of the lithium he is perseverating on, and patient agreed to ordered dose of 10 mg oral zyprexa. Guided himself back to bed.   "

## 2024-08-12 NOTE — PROGRESS NOTES
IP MH Referral Acuity Rating Score (RARS)    LMHP complete at referral to IP MH, with DEC; and, daily while awaiting IP MH placement. Call score to PPS.  CRITERIA SCORING   New 72 HH and Involuntary for IP MH (not adolescent) 1/1   Boarding over 24 hours 1/1   Vulnerable adult at least 55+ with multiple co morbidities; or, Patient age 11 or under 0/1   Suicide ideation without relief of precipitating factors 0/1   Current plan for suicide 0/1   Current plan for homicide 0/1   Imminent risk or actual attempt to seriously harm another without relief of factors precipitating the attempt 1/1   Severe dysfunction in daily living (ex: complete neglect for self care, extreme disruption in vegetative function, extreme deterioration in social interactions) 1/1   Recent (last 2 weeks) or current physical aggression in the ED 1/1   Restraints or seclusion episode in ED 0/1   Verbal aggression, agitation, yelling, etc., while in the ED 1/1   Active psychosis with psychomotor agitation or catatonia 1/1   Need for constant or near constant redirection (from leaving, from others, etc).  1/1   Intrusive or disruptive behaviors 1/1   TOTAL Acuity Total Score: 9

## 2024-08-12 NOTE — CONSULTS
" NEUROLEPTIC MEDICATION NOTE FOR [facility] PROCEEDINGS    Patient s name: Ángel Bardales    FOR EMERGENCY USE ONLY:If a medical emergency has been declared before this note, please describe:   a.The basis for the emergency:   b.The neuroleptic medications provided:   _____________________________________________________________________________________________________________________________________    1. Briefly describe the patient s clinical condition that supports a recommendation for the treatment with neuroleptic medication:   Spoke to writer's brother who reports, \"What seems to happen with Ángel:  he will be hospitalized and get more stable, will be released. He returns home. He stops taking his meds after he feels good for a few weeks and stop taking them.  The other thing that happens is he will get confused and not take the medication as prescribed.  He is not doing this intentionally.  Dad works in the evening.  He is not there watching Ángel full time.\"  \"Baseline for Ángel:  basically be able to complete a thought or complete a sentence.  When he is stable he is able to hold a job.  He was stable for 6 months before this happened.  This cycle of being unstable was started due to his off and on health insurance (related to not being able to work at any one job for more than 6 months) and being able to afford medications.  At one point he was taking lithium that he bought OTC, because he could afford the prescription.  The OTC lithium is no where need the strength of his prescription.  He is now on MA - starting in July 2024.  Ángel often does not remember to get his medications refilled. He will stop taking medication because he runs out.\"    In the ED Ángel has been disrobing, urinating on the floor, talking to himself, saying rude comments to others, saying sexually inappropriate things to staff and peers,  has nonsensical speech, talking about porn, being verbally aggressive, yelling 'bitch\" to female " staff, and throws things when he is agitated.  He had not been taking his medications as prescribed when he presented to the ED.     2.   List the working diagnosis(es) of the condition(s) for which neuroleptic medication is recommended:     Bipolar 1 disorder, mixed episode with psychosis     3. Is neuroleptic medication the treatment of choice in prevailing medical practice?  yes or no: yes, neuroleptics and mood stabilizers    4. Treatment options other than neuroleptics that alone effectively treat the illness:    Mood stabilizers sometimes will be effective as monotherapy (lithium, depakote, or lamotrigine).  Ángel was taking lithium with a neuroleptic to achieve stabilization.       5.   Medication ordered or proposed: (up to 4 medications, no more than two to be used simultaneously unless meds are being changed or emergency exists, unless otherwise specified):    Zyprexa, Haldol, Invega, Abilify     OR: unusual circumstances exist under which physician requests authorization to use any FDA approved neuroleptic.  Identify unusual circumstances and describe proposed course of treatment:     6.   Document the proposed course of treatment with neuroleptic medication, i.e., how the medication will be prescribed, monitored, and adjusted:   Prescribe the neuroleptic that has the best profile for the patient.  Titrate slowly, monitor for adverse reactions. Adjust the dose or change the medication based on the patient's response. Manage side effects according to standards of treatment.         7.   If the patient has a known record with neuroleptics, please summarize his/her history regarding risks/benefits and whether this is predictive of expected response:   Zyprexa and Haldol  - has had good efficacy when taken regularly    8. List the possible risks and side effects, and what can be done should they occur.  Include any specific risks associated with the patient s age/gender/ethnic identity or medical condition.   Does this patient have any medical conditions that could be exacerbated by neuroleptics?    Weight gain: diet and exercise and possible medication for weight management  Extrapyramidal mvmts: reduce dose, change medication, use medication to treat the side effect  Neuroleptic Malignant Syndrome: monitor for symptoms, stop neuroleptic, treat symptomatically, consider different neuroleptic after NMS resolves.       9. Does this patient have any medical conditions that could be exacerbated by neuroleptics yes or no: yes (if yes, please explain)? Obesity.      10. Indicate likely benefits and outcomes for the patient after treatment with neuroleptic medication, including prognosis if neuroleptic medication is not administered (even if other forms of therapy are utilized):  Stablization of mood and psychotic symptoms and ability to function better and return family and community.     11. Does this patient have tardive dyskinesia?   yes or no: no  If yes, how serious is the tardive dyskinesia, why are neuroleptics still indicated and, if applicable, why use typical as opposed to atypical neuroleptics?     12. For a patient to have the capacity to decide about the use of neuroleptics, the answers to a), b) and c) below must be answered  yes.   If one or more of these questions is answered  no,  the Court may find that the patient lacks this capacity, and the Court may make the decision after a hearing.    a)Does the patient demonstrate awareness of the nature of the patient s situation, including the reasons for hospitalization and possible consequences of refusing treatment with neuroleptic medication? yes or no: yes, he has brief periods of lucidity, but mostly has nonsensical or inappropriate speech.     b) Does the patient have an understanding of treatment with neuroleptic medication including the risks, benefits, and alternatives? Possibly.  He has been on medications in the past and therefore must understand.  However, his current presentation does not demonstrate such knowledge.     c)   Does the patient communicate verbally or nonverbally a clear choice regarding treatment that is reasoned and not based on a symptom of the patient s mental illness, even though it may not be in the patient s best interests?   yes or no: yes    d)  If the patient objects, is the objection based on family, community, moral, Hindu, and/or social values?  yes or no: no        If yes, briefly describe:     13. Document specific efforts that have been made to assist patient in understanding the risks and benefits: Patient is not able to have a meaningful conversation at this time.     14. If the patient is consenting to the medication, why is a court order necessary?  If the patient has history of  unreliable consent,  please summarize:   He has a history of not taking his medication as prescribed after he leaves the hospital and decompensates.     15. Is the proposed treatment experimental or part of a research study?  yes or no: no    16. In this patient s case, do the benefits of the treatment outweigh the risks?  Please summarize: Yes, without medication the patient will continue to deteriorate - the longer the patient does not take medication, the less likely the patient is to fully recover.  The patient's disorganized thinking makes the patient vulnerable in the community.       The above statements represent my opinion within a reasonable degree of medical certainty.    Physician s or Other Provider s Signature: _______________________________    Date: August 12, 2024  Time: 6:05 PM    Printed Provider s Name: Krystal Wyatt, DNP, APRN ,CNP    [facility] EMERGENCY DEPARTMENT  [facility] [facility] [address]  [phone]     Please write legibly.   Original must be placed in patient's medical chart.   A copy may be provided to the [facility]  .

## 2024-08-12 NOTE — TELEPHONE ENCOUNTER
R:  [place]  Access Inpatient Bed Call Log 8/11/24 @ 11:30PM  Intake has called facilities that have not updated their bed status within the last 12 hours.     [facility]: @ cap  [facility]: @ cap per website. Per Renea @ 12:22AM, they are full    [facility]: @ cap per website   [facility]: @ cap per website. Per Sujata @ 12:23AM, they are full    [facility]: @ cap per website   [facility]: @ cap per website   [facility]: @ cap per website (Young Adult unit: No recent aggressions, violence or sexual assault. Neg covid required). Per Lucretia @ 12:24AM, no YA beds    [facility]: @ cap per website   [facility]: @ cap per website   [facility]: @ cap per website   [facility]: Posting 6 beds. Mixed unit with adolescents/Low acuity only. Pt not appropriate d/t acuity   [facility]: Posting 1 bed. Per Darius @ 12:27AM, they are at full capacity    [facility]: @ cap per website   [facility]: Posting 2 beds. Per Darius @ 12:27AM, they are at full capacity    [facility]: Does not review after 10PM.     [facility]: Posting 1 bed. Per Charity @ 12:32AM, they are at cap  [facility]: Posting 2 beds. Per Charity @ 12:32AM, they are at cap  [facility]: Posting 2 beds. Per Charity @ 12:32AM, low acuity beds only  [facility]: Posting 2 beds (No hx of aggression. No sexual offenders. Voluntary patients only). Meets exclusionary d/t 72 [facility]: Posting 2 beds (Always low acuity only. Must have the cognitive ability to do programming. No aggressive or violent behavior or recent HX in the last 2 yrs. MH must be primary).  Pt not appropriate d/t acuity   [facility]: @ cap per website   [facility]: Posting 2 beds (Low acuity, Neg Covid). No answer @ 12:34AM   [facility]: Posting 2 beds. Per Irina @ 12:35AM, 1 low acuity female bed and they have 1 pt in their ED.    [facility] Posting 3 beds (No hx of aggression/assault. No lines, drains  or tubes. Does not provide detox or CD treatment. Must have confirmed ride home upon discharge).   [facility]: Posting 20 beds. Facility [place]. Per Intake policy, patients must be voluntary for placement in [place]  [facility]: Posting 2 beds (Mixed unit/Low acuity). Per Madison @ 12:38AM, general/low acuity beds.        Pt remains on work list until appropriate placement is available

## 2024-08-12 NOTE — PROGRESS NOTES
"Triage & Transition Services, Extended Care     Therapy Progress Note    Patient: Ángel goes by \"Ángel,\" uses he/him pronouns  Date of Service: August 12, 2024  Site of Service: [facility] EMERGENCY DEPARTMENT                             ED16A  Patient was seen yes  Mode of Assessment: Virtual: iPad    Presentation Summary: Exchange greeting with patient. Introduced self and role. Pt reported [facility] recommended pt come into the ED due to lack of sleep. Pt only wants to meet with a psychiatrist to get his medication updated. Pt denies meeting with any psych provider over the weekend. Pt presents with delusions and paranoia. Pt believes that everyone is trying to poision him by giving him the wrong doses of medication, changing the clock hands to confuse him, and the \"the clock stood still for three hours\". Pt denies any depression or axiety. Pt stuck his hands down his pants and started a stratching motion, writer asked pt to remove hands from pants. Pt reported he has an \"itch\". Pt does not believe he needs to be in the hospital and would like to leave. Unprovoked, pt reported he is losing his patience and does not want to talk to writer anymore. Pt responding to internal stimuli by looking intensely off camera, when asked what he was staring at, pt just \"SHHH, I'm trying to talk\".    Therapeutic Intervention(s) Provided: Taught the link between thoughts, feelings, and behaviors., Reviewed healthy living that supports positive mental health, including looking at sleep hygiene, regular movement, nutrition, and regular socialization., Engaged in guided discovery, explored patient's perspectives and helped expand them through socratic dialogue.    Current Symptoms:  (pt denies) impaired decision making, irritable, difficulty concentrating  (pt denies) inattentive, impulsive, displaces blame      Mental Status Exam   Affect: Labile  Appearance: Disheveled  Attention Span/Concentration: Other (please comment) " "(Varied)  Eye Contact: Variable    Fund of Knowledge: Appropriate   Language /Speech Content: Expressive Speech  Language /Speech Volume: Normal  Language /Speech Rate/Productions: Pressured  Recent Memory: Variable  Remote Memory: Variable  Mood: Anxious, Apathetic, Irritable, Angry, Other (please comment) (varied)  Orientation to Person: Yes   Orientation to Place: Yes  Orientation to Time of Day: No  Orientation to Date: No     Situation (Do they understand why they are here?): Yes  Psychomotor Behavior: Agitated  Thought Content: Delusions, Paranoia, Hallucinations  Thought Form: Tangential, Flight of Ideas    Treatment Objective(s) Addressed: rapport building, orienting the patient to therapy, assessing safety, processing feelings, exploring obstacles to safety in the community    Patient Response to Interventions: unacceptance expressed, needs reinforcement    Progress Towards Goals: Patient Reports Symptoms Are: ongoing  Patient Progress Toward Goals: is not making progress  Comment: Pt lacks insight into his mental health  Next Step to Work Toward Discharge: patient ability to engage in safety planning  Ability to Engage Comment: Safety planning for a lower level of care is inappropriate at this time as pt has not been able to keep himself and other safe.    Case Management: Case Management Included: collaborating with patient's support system  Details on Collaborating with Patient's Support System: Spoke with RN: Parker Del Valle, spoke with father: Eric Bardales ([phone]), consulted with C&L psych provider: Krystal Wyatt  Summary of Interaction: Per RN: pt is doing ok, responding to some internal stimuli this morning, but we had a positive interaction this morning. Per father: something needs to change as pt is continuing on this cycle of going into the ED, not agreeing to services, getting discharged, mess with his medications, gets worst. Father is at \"wits end\" trying to supervised pt but he is not med " compliant. Per Krystal: will complete [facility] for MI PPS commitment.    Plan: inpatient mental health  yes provider, psych associate Krystal Rueda  no    Clinical Substantiation: Recommendation for inpatient mental health does not change at this time. Pt continues to present with responding to internal stimuli, paranoid, and delusional. Pt demonstrates inability to keep himself and other safe while in a secured setting. Pt attempted to elope, posturing and throwing things. Pt is labile and lacks insight into his mental health. MI PPS will be submitted to [facility] for review.    Legal Status: Legal Status at Admission: 72 Hour Hold  72 Hour Hold - Date/Time Initiated: 8/10/24 at 1338  72 Hour Hold - Date/Time Ends: 8/15/2024 at 0001    Session Status: Time session started: 0917  Time session ended: 0933  Session Duration (minutes): 16 minutes  Session Number: 1  Anticipated number of sessions or this episode of care: 4    Time Spent: 16 minutes    CPT Code: CPT Codes: 11948 - Psychotherapy (with patient) - 30 (16-37*) min    Diagnosis:   Patient Active Problem List   Diagnosis Code    Bipolar 1 disorder, depressed (H) F31.9    Bipolar 1 disorder, mixed, moderate (H) F31.62    Bipolar I disorder, most recent episode mixed, severe with psychotic features (H) F31.64    Class 3 severe obesity due to excess calories without serious comorbidity with body mass index (BMI) of 40.0 to 44.9 in adult (H) E66.01, Z68.41    Flank pain R10.9    Former smoker Z87.891    GERD (gastroesophageal reflux disease) K21.9    History of cannabis abuse F12.11    History of diabetes insipidus Z86.39    Insomnia due to other mental disorder F51.05, F99    Mixed hyperlipidemia E78.2    Severe recurrent major depression with psychotic features, mood-congruent (H) F33.3    Suicidal ideation R45.851    Vitamin D insufficiency E55.9    Bipolar 1 disorder (H) F31.9       Primary Problem This Admission: Active Hospital Problems     *Bipolar 1 disorder (H)        Jaycee Quispe, [facility]   Licensed Mental Health Professional (LMHP), [facility]  [phone]

## 2024-08-12 NOTE — TELEPHONE ENCOUNTER
R: [place]  Access Inpatient Bed Call Log 8/12/24 @ 1795   Intake has called facilities that have not updated the bed status within the last 12 hours.                               [facility] is 0 beds.          [facility] is posting 0 beds. [phone];   [facility] is posting 0 beds. Negative covid required   [facility] is posting 0 beds. Neg covid. No high school/Jami-psych. [phone].     [facility] is posting 0 beds. [phone]   [facility] is posting 2 beds. [phone]  9:21 AM Per Caleb @  Direct, @ capacity.   [facility] is posting 2 beds. Ages 18-35. Negative covid. [phone]. Pt not age appropriate.   [facility] is posting 0 beds.    [facility] is posting  0 bed. [phone]     [facility] is posting 6 beds. LOW acuity ONLY. Mixed unit 12+. Negative covid- [phone]   [facility] has 1 bed posted. No aggression. Negative Covid. Low acuity.   [facility] ([place]) is posting 1 bed. Low acuity only. Neg covid.  [phone] : per call at 7:08 am to Lidya, they are at cap.   [facility] is posting 2 beds. Low acuity. No current aggression.     [facility] is posting 0 beds. Negative covid. [phone]; per call at 7:12 am, recording said their adult unit is at cap and their adol unit has 1 bed avail (call [phone], ext [phone]).   [facility] ([place]) is posting 2 beds available. No acute beds avail. Negative covid.  [phone].       [facility] is posting 1 bed. Low acuity. 72 HH hold preferred. Negative covid required. [phone]:    [facility] ([place]) is posting 2 beds. Low acuity only. Neg covid.  [phone]; .      [facility] in [place] is posting 2 beds.  Negative covid required.   Vol only, No history of aggression, violence, or assault. No sexual offenders. No 72  holds. [phone]      [facility] is posting 2  beds. Negative covid required.  (Must have the cognitive ability to do programming. No aggressive or violent behavior or recent HX in the last 2 yrs. MH must be primary.) Always low acuity. [phone];     [facility] has 0 beds posted. Negative covid required.  Low acuity only. Violence and aggression capped.  [phone]     [facility] beds. Low acuity, Negative covid required. [phone]: per call at 7:22 am to Rashida, they are at cap but can add pts to their wait list.    [facility] posting   3 beds Negative covid required.  [phone]:     [facility] [place] is posting  3 beds. Negative covid. LOW acuity. (No lines, drains, or tubes, oxygen, CPAP, IV, etc.) Must Have a Ride Home. [phone]; svetlana   [facility] is posting  2  beds. Negative covid. [phone]: (No. lines, drains, or tubes, oxygen, CPAP, IV, etc.);       Pt remains on the work list pending appropriate bed availability.      10:47 AM [facility] has one high acuity bed available, they can review pt.   10:52 AM Clinical faxed to [facility] for review.   12:15 PM Called [facility] to cancel review as pt was accepted to [facility].       11:09 AM Paged Jory to review pt for admission to [facility].   11:40 AM Jory accepts pt for admission to [facility]. [facility] pt will need a 1:1 and will be admitted after a 1PM discharge on the unit.   12:03 PM Called [facility] and informed DYLAN Harmon of pt in queue. She will review chart.   12:07 PM Provided ED with placement info.

## 2024-08-12 NOTE — CONSULTS
[facility] COURT- PROBATE/MENTAL HEALTH DIVISION  F[address] ([place])  _____________________________________________________________________________________________________________________________________    WRITTEN REQUEST FOR AUTHORIZATION TO IMPOSE TREATMENT AND REQUEST FOR HEARING    In the Matter of the Civil Commitment of:   Ángel Bardales    :1985       Respondent: Ángel Bardales   CT File No.  [id number]   [place]  File No. __________     IKrystal APRN CNP to the best of my knowledge, information, and belief respectfully represent:    I am a member of the treatment team for the respondent.    2. Respondent was born on 1985.     3. Respondent is presently receiving care and treatment at: [facility].    4. Diagnostic studies performed by ODILIA Conti CNP on respondent appear to indicate that respondent suffers from:     Bipolar 1 disorder, mixed episode with psychosis     5.   ODILIA Conti CNP  has determined neuroleptic medication(s) to be medically necessary.    6.   I have determined that the benefits of administration of the proposed treatment to the respondent outweigh the risks and therefore this procedure is reasonable.    7.   Respondent s written informed consent to the administration of the above procedure has notbeen obtained because of incompetency to make a rational decision regarding the proposedtreatment.    8.The objective of the proposed treatment is to treat the symptoms of the mental illness that interfere with the respondent s ability to function.    9.Petitioner requests that a hearing be scheduled, and that authorization to administer the proposed treatment be granted according to law.     Dated: 2024    Provider s Signature: _________________________  Print Name    Krystal Wyatt, DNP, APRN ,CNP      [facility]  [facility] EMERGENCY DEPARTMENT  [address] [place] [address]  [phone]  [phone]

## 2024-08-13 ENCOUNTER — TELEPHONE (OUTPATIENT)
Dept: BEHAVIORAL HEALTH | Facility: CLINIC | Age: 39
End: 2024-08-13
Payer: COMMERCIAL

## 2024-08-13 PROBLEM — F31.12 BIPOLAR AFFECTIVE DISORDER, CURRENTLY MANIC, MODERATE (H): Status: ACTIVE | Noted: 2024-08-13

## 2024-08-13 LAB
HBA1C MFR BLD: 6.4 %
TSH SERPL DL<=0.005 MIU/L-ACNC: 2.07 UIU/ML (ref 0.3–4.2)

## 2024-08-13 PROCEDURE — 250N000013 HC RX MED GY IP 250 OP 250 PS 637: Performed by: REGISTERED NURSE

## 2024-08-13 PROCEDURE — 90853 GROUP PSYCHOTHERAPY: CPT | Performed by: COUNSELOR

## 2024-08-13 PROCEDURE — 93005 ELECTROCARDIOGRAM TRACING: CPT

## 2024-08-13 PROCEDURE — 99233 SBSQ HOSP IP/OBS HIGH 50: CPT | Performed by: REGISTERED NURSE

## 2024-08-13 PROCEDURE — 250N000013 HC RX MED GY IP 250 OP 250 PS 637: Performed by: EMERGENCY MEDICINE

## 2024-08-13 PROCEDURE — 124N000002 HC R&B MH UMMC

## 2024-08-13 PROCEDURE — 99418 PROLNG IP/OBS E/M EA 15 MIN: CPT | Performed by: REGISTERED NURSE

## 2024-08-13 PROCEDURE — 93010 ELECTROCARDIOGRAM REPORT: CPT | Performed by: INTERNAL MEDICINE

## 2024-08-13 RX ORDER — OLANZAPINE 10 MG/2ML
10 INJECTION, POWDER, FOR SOLUTION INTRAMUSCULAR 3 TIMES DAILY PRN
Status: DISCONTINUED | OUTPATIENT
Start: 2024-08-13 | End: 2024-08-13

## 2024-08-13 RX ORDER — OLANZAPINE 10 MG/1
10 TABLET ORAL 3 TIMES DAILY PRN
Status: DISCONTINUED | OUTPATIENT
Start: 2024-08-13 | End: 2024-08-13

## 2024-08-13 RX ORDER — POLYETHYLENE GLYCOL 3350 17 G
2 POWDER IN PACKET (EA) ORAL
Status: DISCONTINUED | OUTPATIENT
Start: 2024-08-13 | End: 2024-08-13

## 2024-08-13 RX ORDER — OLANZAPINE 10 MG/2ML
10 INJECTION, POWDER, FOR SOLUTION INTRAMUSCULAR 3 TIMES DAILY PRN
Status: DISCONTINUED | OUTPATIENT
Start: 2024-08-13 | End: 2024-08-22 | Stop reason: HOSPADM

## 2024-08-13 RX ORDER — MIRTAZAPINE 7.5 MG/1
7.5 TABLET, FILM COATED ORAL AT BEDTIME
Status: DISCONTINUED | OUTPATIENT
Start: 2024-08-13 | End: 2024-08-16

## 2024-08-13 RX ORDER — ACETAMINOPHEN 325 MG/1
650 TABLET ORAL EVERY 4 HOURS PRN
Status: DISCONTINUED | OUTPATIENT
Start: 2024-08-13 | End: 2024-08-22 | Stop reason: HOSPADM

## 2024-08-13 RX ORDER — HYDROXYZINE HYDROCHLORIDE 50 MG/1
50 TABLET, FILM COATED ORAL EVERY 4 HOURS PRN
Status: DISCONTINUED | OUTPATIENT
Start: 2024-08-13 | End: 2024-08-14

## 2024-08-13 RX ORDER — MAGNESIUM HYDROXIDE/ALUMINUM HYDROXICE/SIMETHICONE 120; 1200; 1200 MG/30ML; MG/30ML; MG/30ML
30 SUSPENSION ORAL EVERY 4 HOURS PRN
Status: DISCONTINUED | OUTPATIENT
Start: 2024-08-13 | End: 2024-08-22 | Stop reason: HOSPADM

## 2024-08-13 RX ORDER — OLANZAPINE 10 MG/1
10 TABLET ORAL 3 TIMES DAILY PRN
Status: DISCONTINUED | OUTPATIENT
Start: 2024-08-13 | End: 2024-08-22 | Stop reason: HOSPADM

## 2024-08-13 RX ORDER — POLYETHYLENE GLYCOL 3350 17 G
2-4 POWDER IN PACKET (EA) ORAL
Status: DISCONTINUED | OUTPATIENT
Start: 2024-08-13 | End: 2024-08-22 | Stop reason: HOSPADM

## 2024-08-13 RX ORDER — AMOXICILLIN 250 MG
1 CAPSULE ORAL 2 TIMES DAILY PRN
Status: DISCONTINUED | OUTPATIENT
Start: 2024-08-13 | End: 2024-08-14

## 2024-08-13 RX ORDER — HALOPERIDOL 5 MG/1
5 TABLET ORAL 2 TIMES DAILY PRN
Status: DISCONTINUED | OUTPATIENT
Start: 2024-08-13 | End: 2024-08-22 | Stop reason: HOSPADM

## 2024-08-13 RX ORDER — QUETIAPINE FUMARATE 100 MG/1
100 TABLET, FILM COATED ORAL
Status: DISCONTINUED | OUTPATIENT
Start: 2024-08-13 | End: 2024-08-13

## 2024-08-13 RX ORDER — HYDROXYZINE HYDROCHLORIDE 50 MG/1
50 TABLET, FILM COATED ORAL EVERY 4 HOURS PRN
Status: DISCONTINUED | OUTPATIENT
Start: 2024-08-13 | End: 2024-08-13

## 2024-08-13 RX ORDER — PALIPERIDONE 3 MG/1
3 TABLET, EXTENDED RELEASE ORAL DAILY
Status: DISCONTINUED | OUTPATIENT
Start: 2024-08-13 | End: 2024-08-14

## 2024-08-13 RX ORDER — POLYETHYLENE GLYCOL 3350 17 G
4 POWDER IN PACKET (EA) ORAL
Status: DISCONTINUED | OUTPATIENT
Start: 2024-08-13 | End: 2024-08-13

## 2024-08-13 RX ORDER — TRAZODONE HYDROCHLORIDE 100 MG/1
100 TABLET ORAL
Status: CANCELLED | OUTPATIENT
Start: 2024-08-13

## 2024-08-13 RX ADMIN — NICOTINE POLACRILEX 2 MG: 2 LOZENGE ORAL at 13:38

## 2024-08-13 RX ADMIN — NICOTINE POLACRILEX 2 MG: 2 LOZENGE ORAL at 15:05

## 2024-08-13 RX ADMIN — NICOTINE POLACRILEX 2 MG: 2 LOZENGE ORAL at 00:23

## 2024-08-13 RX ADMIN — NICOTINE POLACRILEX 4 MG: 2 LOZENGE ORAL at 09:02

## 2024-08-13 RX ADMIN — METFORMIN HYDROCHLORIDE 500 MG: 500 TABLET, EXTENDED RELEASE ORAL at 17:30

## 2024-08-13 RX ADMIN — PALIPERIDONE 3 MG: 3 TABLET, EXTENDED RELEASE ORAL at 12:03

## 2024-08-13 RX ADMIN — MIRTAZAPINE 7.5 MG: 7.5 TABLET, FILM COATED ORAL at 20:32

## 2024-08-13 RX ADMIN — OLANZAPINE 20 MG: 20 TABLET, ORALLY DISINTEGRATING ORAL at 20:32

## 2024-08-13 RX ADMIN — NICOTINE POLACRILEX 4 MG: 2 LOZENGE ORAL at 17:30

## 2024-08-13 RX ADMIN — LITHIUM CARBONATE 1200 MG: 450 TABLET, EXTENDED RELEASE ORAL at 20:31

## 2024-08-13 ASSESSMENT — ACTIVITIES OF DAILY LIVING (ADL)
ADLS_ACUITY_SCORE: 35
ADLS_ACUITY_SCORE: 30
ADLS_ACUITY_SCORE: 35
ADLS_ACUITY_SCORE: 30
ADLS_ACUITY_SCORE: 35
ADLS_ACUITY_SCORE: 30
ADLS_ACUITY_SCORE: 30
ADLS_ACUITY_SCORE: 45
ADLS_ACUITY_SCORE: 30
ADLS_ACUITY_SCORE: 35
ADLS_ACUITY_SCORE: 30
ADLS_ACUITY_SCORE: 30
ADLS_ACUITY_SCORE: 35
ADLS_ACUITY_SCORE: 30
ADLS_ACUITY_SCORE: 35
ADLS_ACUITY_SCORE: 45

## 2024-08-13 NOTE — PLAN OF CARE
BEH IP Unit Acuity Rating Score (UARS)  Patient is given one point for every criteria they meet.    CRITERIA SCORING   On a 72 hour hold, court hold, committed, stay of commitment, or revocation. 0    Patient LOS on BEH unit exceeds 20 days. 0  LOS: 0   Patient under guardianship, 55+, otherwise medically complex, or under age 11. 0   Suicide ideation without relief of precipitating factors. 0   Current plan for suicide. 0   Current plan for homicide. 0   Imminent risk or actual attempt to seriously harm another without relief of factors precipitating the attempt. 0   Severe dysfunction in daily living (ex: complete neglect for self care, extreme disruption in vegetative function, extreme deterioration in social interactions). 1   Recent (last 7 days) or current physical aggression in the ED or on unit. 1   Restraints or seclusion episode in past 72 hours. 0   Recent (last 7 days) or current verbal aggression, agitation, yelling, etc., while in the ED or unit. 1   Active psychosis. 1   Need for constant or near constant redirection (from leaving, from others, etc).  0   Intrusive or disruptive behaviors. 0   Patient requires 3 or more hours of individualized nursing care per 8-hour shift (i.e. for ADLs, meds, therapeutic interventions). 1   TOTAL 5

## 2024-08-13 NOTE — PLAN OF CARE
08/13/24 1110   Individualization/Patient Specific Goals   Patient Personal Strengths stable living environment;family/social support   Patient Vulnerabilities poor impulse control;lacks insight into illness   Anxieties, Fears or Concerns none noted   Individualized Care Needs none noted   Interprofessional Rounds   Summary Ángel Bardales presents to the ED via EMS. Patient is presenting to the ED for the following concerns:  (Yareli).   Factors that make the mental health crisis life threatening or complex are:  Patient has history of bipolar disorder. Patient has been hospitalized 3 times in the past 3 months for decompensation. Patient has been non-compliant with his medication regimen for unspecified amount of time   Participants advanced practice nurse;nursing;CTC   Behavioral Team Discussion   Participants Provider: Geneva Corley,NP, Olimpia MATTA, Eric Scott RN Jh Blanchard OT   Progress Requires ongoing stabilization   Anticipated length of stay TBD 7+ days   Continued Stay Criteria/Rationale TBD   Medical/Physical See H&P   Precautions See below   Plan TBD discharge home with OP supports   Anticipated Discharge Disposition IRTS;home with family;other (see comments)     PRECAUTIONS AND SAFETY    Behavioral Orders   Procedures    Assault precautions    Cheeking Precautions (behavioral units)    Code 1 - Restrict to Unit    Elopement precautions    Routine Programming     As clinically indicated    Status 15     Every 15 minutes.    Status Individual Observation     Patient SIO status reviewed with team/RN.  Please also refer to RN/team documentation for add'l detail.    -SIO staff to monitor following which have contributed to patient being on SIO: assault risk, severe intrusiveness  -Possible interventions SIO staff could use to support patient's treatment progress: redirection, offer PRN meds  -When following observed, team will review discontinuation of SIO:  Able to consistently follow  redirection and unit guidelines     Order Specific Question:   CONTINUOUS 24 hours / day     Answer:   Other     Order Specific Question:   Specify distance     Answer:   10 feet     Order Specific Question:   Indications for SIO     Answer:   Severe intrusiveness     Order Specific Question:   Indications for SIO     Answer:   Assault risk    Suicide precautions: Suicide Risk: HIGH     Patients on Suicide Precautions should have a Combination Diet ordered that includes a Diet selection(s) AND a Behavioral Tray selection for Safe Tray - with utensils, or Safe Tray - NO utensils       Order Specific Question:   Suicide Risk     Answer:   HIGH       Safety  Safety WDL: WDL  Patient Location: conference room  Safety Measures: environmental rounds completed, safety rounds completed  Suicidality: Status 15
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
## 2024-08-13 NOTE — PLAN OF CARE
"  Problem: Adult Inpatient Plan of Care  Goal: Plan of Care Review  Description: The Plan of Care Review/Shift note should be completed every shift.  The Outcome Evaluation is a brief statement about your assessment that the patient is improving, declining, or no change.  This information will be displayed automatically on your shift  note.  8/13/2024 1109 by Neo Simmons, RN  Outcome: Progressing  8/13/2024 1109 by Neo Simmons, RN  Outcome: Progressing   Goal Outcome Evaluation:    Plan of Care Reviewed With: patient        Pt arrived on the unit after report was received by phone from Tia in ED. VSS, and down to gown safety search completed. Pt was oriented to unit and typical procedures. Admission packet including patient bill of right was provided to patient and reviewed with writer. Pt denied having any questions about the bill of rights and signed in voluntarily to receive treatment.Pt denied hallucinations. Pt denied suicidal thoughts, homicidal thoughts, and thoughts to self-harm. Pt does endorse anxiety which he attributes to fear of the unknown. During conversation with writer pt raised his voice at odd points, began singing in a whisper, and belched loudly at provider. Questions were answered. Pt remains on continuous observation for intrusiveness.    Pt signed release of information for staff to talk with his father, Eric. Pt received nicotine lozenges x2 this shift. Pt c/o feeling like his lymph nodes were swollen and that his neck is \"scrunched\" when he lays down in bed. Writer palpated patient's neck and did not feel any swelling. Pt reports he was supposed to complete a sleep study but has not been able to get it done. The provider was notified and has recommended a wedge pillow, which will be provided to patient.           "

## 2024-08-13 NOTE — TELEPHONE ENCOUNTER
11:54pm - Writer received call from CRN on [facility] stating the unit is only staffed until 3am, therefore cannot take pt who requires 1:1 staffing consistently. Writer will make note of admission delay and update ANS of prioritizing staffing to [facility] for day shift tomorrow since pt is reaching 24 hours since acceptance.     12:15am - Paged ANS    12:46am - ANS called back and stated she would prioritize the unit for staffing for day shift due to long wait time in queue and confirmed multiple units are short staffed for the night shift tonight.

## 2024-08-13 NOTE — PLAN OF CARE
"        INITIAL PSYCHOSOCIAL ASSESSMENT AND NOTE    Information for assessment was obtained from:       []Patient     []Parent     []Community provider    [x]Hospital records   []Other     []Guardian    Patient refused to participate. I met with Ángel in his room today per his request. I introduced myself and explained that his primary  will be OOO until Monday. inquired if he had any immediate questions as staff informed me that Ángel requested to meet with his  several times today. Ángel asked  \" I was wondering why they told my dad that you were my  and its Hope\". I again explained that Hope is out of the office and that staff informed his dad of this . He replied with a disorganized statement and appeared angry \" Hope is a contiguous on a string. Dont give me that . Dont give me that. \"  He followed with \" thank you. That is it\" He placed his headphone on his head. He did not particiapte in the psyco social assessment. He appears  disorganized, delusional, and labia       Presenting Problem:  Ángel is a 38 year old male who uses he/him. He presented to [facility] on 08/09/2024 with his father  due to priti. . He was admitted to [facility] on a 72HH. Petition was dropped as patient's symptoms have improved, willing to sign in voluntary and comply with medication regime.     Events leading to hospitalization and current stressors:     Per chart, Patient has been hospitalized 3 times in the past 3 months for decompensation. Patient has been non-compliant with his medication regimen for unspecified amount of time. Patient presents with father at the recommendation of [facility] for further evaluation.     Current mental health symptoms as of today  See H&P        History of Mental Health and Chemical Dependency:  Mental Health History:  He  has a historical diagnosis of bipolar . Past treatments include: Primary Care, Inpatient Hospitalization, " Psychiatric Medication Management, Individual therapy. Patient has 3 recent inpatient mental health admissions within the past month. Patient has outpatient medication management with [facility]. Patient has been non-compliant with his medication regimen for an unknown amount of time. Patient does not have a history of suicide attempt. He has a history of verbal threats.    Substance Use History  Patient reports drinking alcohol approximately 1-2 times per week.  Patient typically drinks beer.       Patient reports using cannabis 10 times over his lifetime.       Patient is a former cigarette smoker.  He previously smoked 1 pack/day.  He quit in 2021.  Patient currently vapes daily.  He reports vaping 5 g of nicotine over a period of 7 days.       Patient denies a history of CD treatment.  He denies use of other substances.      Family Description (Constellation, significant information and events, Family Psychiatric History)    Born in [place], and raised in [place]. He is 3/4 of sibling. He has no children. He is not . He is single. His main support is his father. He has discourse with his mother who remarried.     Family History   ?Patient reports his father and paternal uncle have bipolar 1 disorder.     Significant Medical issues, Life events or Trauma history:   Unknown at this time.     Living Situation:  Lives with his father    Educational Background:    Patient received his Associates degree in [employer] arts from [employer].        Occupational and Financial Status:   Occupational  Patient reports having worked various jobs in the past, including [employer] and [employer].  Patient is currently disabled.     Financial   MA PMAP is active     Legal (current or past history):     voluntary     Service History: No history    Ethnic/Cultural/Spiritual considerations:   The patient describes their cultural background as White/, heterosexual, male.  Contextual  influences on patient's health include severity of symptoms and level of functioning.   Patient identified their preferred language to be English. Patient reported they do not need the assistance of an .     Social Functioning (organizations, interests, support system):   cooking, baking, playing guitar, rapping/singing, writing music       Current Treatment Providers are:  Last appointment scheduled in July with  Jose Maria Adhikari DNP, [facility], [address], [address], [place]  [address]; [phone], [phone] (fax)     Social Service Assessment/Plan:  Goals for hospitalization: Ángel did not acknowledge his goals. He may benefit from a structural environment in the future such as a group home. He may benefit from working with a  in the future.       Patient will have psychiatric assessment and medication management by the psychiatrist. Medications will be reviewed and adjusted per DO/MD/APRN CNP as indicated. The treatment team will continue to assess and stabilize the patient's mental health symptoms with the use of medications and therapeutic programming. Hospital staff will provide a safe environment and a therapeutic milieu. Staff will continue to assess patient as needed. Patient will participate in unit groups and activities. Patient will receive individual and group support on the unit.      CTC will do individual inpatient treatment planning and after care planning. CTC will discuss options for increasing community supports with the patient. CTC will coordinate with outpatient providers and will place referrals to ensure appropriate follow up care is in place.

## 2024-08-13 NOTE — PLAN OF CARE
"   08/13/24 0915   Patient Belongings   Did you bring any home meds/supplements to the hospital?  Yes  (medications given to RN for itemization and triage to pharmacy safe)   Disposition of meds  Sent to security/pharmacy per site process   Patient Belongings remains with patient;locker;sent to security per site process  (Patient insisted on keeping \"crocs\" sandals)   Patient Belongings Remaining with Patient other (see comments)   Patient Belongings Put in Hospital Secure Location (Security or Locker, etc.) other (see comments)  (see attached note for itemization)   Belongings Search Yes  (pt became agitated during search)   Clothing Search Yes  (pt was agitated during search and needed much redirection to follow through)   Second Staff Mamu   Comment Pt was very disorganized and seemingly paranoid during search     Items left with patient: \"crocs\" sandals and glasses after search and RN review    Medications sent to pharmacy safe itemized and sent  in envelope [id number]    Valuables sent to security:   envelope [id number] containing 12 dollars,3 credit cards and EBT    Items stored in unit locker 11:   Duffle bag, cell phone, wallet with D.L. , ins card and misc., (cash and credit cards itemized and sent to safe as listed above) , shaving kit with razor and shaving cream, contact lenses case with contact solution, toothbrush and paste with misc toiletries, shorts, shirt, belt, and shoes with orthotics.    ..A               Admission:  I am responsible for any personal items that are not sent to the safe or pharmacy.  [facility] is not responsible for loss, theft or damage of any property in my possession.    Signature:  _________________________________ Date: _______  Time: _____                                              Staff Signature:  ____________________________ Date: ________  Time: _____      2nd Staff person, if patient is unable/unwilling to sign:    Signature: ________________________________ Date: ________  " Time: _____     Discharge:  [facility] has returned all of my personal belongings:    Signature: _________________________________ Date: ________  Time: _____                                          Staff Signature:  ____________________________ Date: ________  Time: _____

## 2024-08-13 NOTE — PLAN OF CARE
Goal Outcome Evaluation:    Initial meeting note:    Therapist introduced self to patient and discussed psychotherapy service available to patient.     Pt response: Pt expressed interest in meeting with therapist    Plan: Made plan to meet with pt again; began identifying goals     Pt is still confused, loose associations. He was first in group, writer and he had some 1:1 time briefly to complete the safety plan.    More people joined group, writer and he agreed to meet 1:1 again this week.

## 2024-08-13 NOTE — PLAN OF CARE
"Upcoming Meetings and Appointments:   Team note: Wednesday     Tasks Complete:  Chart review and met with team, discussed pt progress, symptomology, and response to treatment.  Discussed the discharge plan and any potential impediments to discharge    Spoke with PPS Susy Nevarez to notify that the care team is no longer pursing commitment and hernandez. Susy referred me to the 's office. I spoke with Sanam lemus, who will notify me of next steps to drop the petition for commitment. Sanam requested that I formulate and email noting rationale for dropping the petition.     I met with Ángel in his room today per his request. I introduced myself and explained that his primary  will be OOO until Monday. inquired if he had any immediate questions as staff informed me that Ángel requested to meet with his  several times today. Ángel asked  \" I was wondering why they told my dad that you were my  and its Hope\". I again explained that Hope is out of the office and that staff informed his dad of this . He replied with a disorganized statement and appeared angry \" Hope is a contiguous on a string. Dont give me that . Dont give me that. \"  He followed with \" thank you. That is it\" He placed his headphone on his head. He did not particiapte in the psyco social assessment. He appears  disorganized, delusional, and labial.     Discharge Plan or Goal   Plan  TBD discharge home with PHP or IOP . Possibly explore residential.     Care Team   Last appointment scheduled in July with  Jose Maria Adhikari DNP, [facility], [address], [place]  [address]; [phone], [phone] (fax)      Barriers to Discharge   Requires ongoing stabilization     Referral Status  TBD     Legal Status  Voluntary     Next Steps:  Schedule OP appointments and make referral when Ángel is closer to baseline        "

## 2024-08-13 NOTE — PLAN OF CARE
Goal Outcome Evaluation:      Group Attendance:  attended full group    Time session began: 2:15 pm  Time session ended: 2:55 pm  Patient's total time in group: 40    Total # Attendees   3   Group Type psychotherapeutic and DBT     Group Topic Covered insight improvement, symptom management, relationships, and DBT skills: House     Group Session Detail DBT house     Patient's response to the group topic/interactions:  cooperative with task, actively engaged, disorganized, appeared confused , nonsensical verbalizations, needed prompts to redirect, verbalizations were off topic, and interrupting, speaking when others are speaking etc     Patient Details: Feeling trapped, glad he trusted God to find his glasses           [id number] - Group psychotherapy - 1 Session  Patient Active Problem List   Diagnosis    Bipolar 1 disorder, depressed (H)    Bipolar 1 disorder, mixed, moderate (H)    Bipolar I disorder, most recent episode mixed, severe with psychotic features (H)    Class 3 severe obesity due to excess calories without serious comorbidity with body mass index (BMI) of 40.0 to 44.9 in adult (H)    Flank pain    Former smoker    GERD (gastroesophageal reflux disease)    History of cannabis abuse    History of diabetes insipidus    Insomnia due to other mental disorder    Mixed hyperlipidemia    Severe recurrent major depression with psychotic features, mood-congruent (H)    Suicidal ideation    Vitamin D insufficiency    Bipolar 1 disorder (H)    Bipolar affective disorder, currently manic, moderate (H)

## 2024-08-13 NOTE — TELEPHONE ENCOUNTER
11:30 PM Intake called unit 32 for an update - Spoke with RN, the unit has appropriate staff to admit pt this shift.

## 2024-08-14 LAB
ATRIAL RATE - MUSE: 80 BPM
CHOLEST SERPL-MCNC: 143 MG/DL
DIASTOLIC BLOOD PRESSURE - MUSE: NORMAL MMHG
FOLATE SERPL-MCNC: 16.4 NG/ML (ref 4.6–34.8)
HDLC SERPL-MCNC: 25 MG/DL
HOLD SPECIMEN: NORMAL
INTERPRETATION ECG - MUSE: NORMAL
LDLC SERPL CALC-MCNC: 73 MG/DL
NONHDLC SERPL-MCNC: 118 MG/DL
P AXIS - MUSE: 45 DEGREES
PR INTERVAL - MUSE: 156 MS
QRS DURATION - MUSE: 90 MS
QT - MUSE: 374 MS
QTC - MUSE: 431 MS
R AXIS - MUSE: 1 DEGREES
SYSTOLIC BLOOD PRESSURE - MUSE: NORMAL MMHG
T AXIS - MUSE: 21 DEGREES
TRIGL SERPL-MCNC: 223 MG/DL
VENTRICULAR RATE- MUSE: 80 BPM
VIT B12 SERPL-MCNC: 865 PG/ML (ref 232–1245)
VIT D+METAB SERPL-MCNC: 32 NG/ML (ref 20–50)

## 2024-08-14 PROCEDURE — 99232 SBSQ HOSP IP/OBS MODERATE 35: CPT | Performed by: REGISTERED NURSE

## 2024-08-14 PROCEDURE — 82746 ASSAY OF FOLIC ACID SERUM: CPT | Performed by: REGISTERED NURSE

## 2024-08-14 PROCEDURE — 80061 LIPID PANEL: CPT | Performed by: REGISTERED NURSE

## 2024-08-14 PROCEDURE — 36415 COLL VENOUS BLD VENIPUNCTURE: CPT | Performed by: REGISTERED NURSE

## 2024-08-14 PROCEDURE — 82306 VITAMIN D 25 HYDROXY: CPT | Performed by: REGISTERED NURSE

## 2024-08-14 PROCEDURE — 250N000013 HC RX MED GY IP 250 OP 250 PS 637: Performed by: REGISTERED NURSE

## 2024-08-14 PROCEDURE — 250N000013 HC RX MED GY IP 250 OP 250 PS 637: Performed by: EMERGENCY MEDICINE

## 2024-08-14 PROCEDURE — 124N000002 HC R&B MH UMMC

## 2024-08-14 PROCEDURE — 82607 VITAMIN B-12: CPT | Performed by: REGISTERED NURSE

## 2024-08-14 RX ORDER — AMOXICILLIN 250 MG
1 CAPSULE ORAL 2 TIMES DAILY
Status: DISCONTINUED | OUTPATIENT
Start: 2024-08-14 | End: 2024-08-19

## 2024-08-14 RX ORDER — DIVALPROEX SODIUM 250 MG/1
250 TABLET, DELAYED RELEASE ORAL EVERY 12 HOURS SCHEDULED
Status: DISCONTINUED | OUTPATIENT
Start: 2024-08-14 | End: 2024-08-15

## 2024-08-14 RX ORDER — GABAPENTIN 100 MG/1
100 CAPSULE ORAL 3 TIMES DAILY PRN
Status: DISCONTINUED | OUTPATIENT
Start: 2024-08-14 | End: 2024-08-22 | Stop reason: HOSPADM

## 2024-08-14 RX ORDER — PALIPERIDONE 3 MG/1
3 TABLET, EXTENDED RELEASE ORAL DAILY
Status: COMPLETED | OUTPATIENT
Start: 2024-08-14 | End: 2024-08-14

## 2024-08-14 RX ORDER — PALIPERIDONE 6 MG/1
6 TABLET, EXTENDED RELEASE ORAL DAILY
Status: DISCONTINUED | OUTPATIENT
Start: 2024-08-15 | End: 2024-08-16

## 2024-08-14 RX ORDER — PROPRANOLOL HYDROCHLORIDE 10 MG/1
10 TABLET ORAL 3 TIMES DAILY
Status: DISCONTINUED | OUTPATIENT
Start: 2024-08-14 | End: 2024-08-16

## 2024-08-14 RX ADMIN — PROPRANOLOL HYDROCHLORIDE 10 MG: 10 TABLET ORAL at 09:32

## 2024-08-14 RX ADMIN — NICOTINE POLACRILEX 2 MG: 2 LOZENGE ORAL at 10:10

## 2024-08-14 RX ADMIN — SENNOSIDES AND DOCUSATE SODIUM 1 TABLET: 50; 8.6 TABLET ORAL at 10:10

## 2024-08-14 RX ADMIN — NICOTINE POLACRILEX 2 MG: 2 LOZENGE ORAL at 13:41

## 2024-08-14 RX ADMIN — PALIPERIDONE 3 MG: 3 TABLET, EXTENDED RELEASE ORAL at 08:24

## 2024-08-14 RX ADMIN — METFORMIN HYDROCHLORIDE 500 MG: 500 TABLET, EXTENDED RELEASE ORAL at 17:50

## 2024-08-14 RX ADMIN — PROPRANOLOL HYDROCHLORIDE 10 MG: 10 TABLET ORAL at 13:48

## 2024-08-14 RX ADMIN — DIVALPROEX SODIUM 250 MG: 250 TABLET, DELAYED RELEASE ORAL at 21:00

## 2024-08-14 RX ADMIN — NICOTINE POLACRILEX 2 MG: 2 LOZENGE ORAL at 06:34

## 2024-08-14 RX ADMIN — OLANZAPINE 20 MG: 20 TABLET, ORALLY DISINTEGRATING ORAL at 21:01

## 2024-08-14 RX ADMIN — PALIPERIDONE 3 MG: 3 TABLET, EXTENDED RELEASE ORAL at 09:31

## 2024-08-14 RX ADMIN — Medication 975 MG: at 21:00

## 2024-08-14 RX ADMIN — DIVALPROEX SODIUM 250 MG: 250 TABLET, DELAYED RELEASE ORAL at 09:30

## 2024-08-14 RX ADMIN — PROPRANOLOL HYDROCHLORIDE 10 MG: 10 TABLET ORAL at 21:01

## 2024-08-14 RX ADMIN — SENNOSIDES AND DOCUSATE SODIUM 1 TABLET: 50; 8.6 TABLET ORAL at 21:00

## 2024-08-14 RX ADMIN — MIRTAZAPINE 7.5 MG: 7.5 TABLET, FILM COATED ORAL at 21:00

## 2024-08-14 ASSESSMENT — ACTIVITIES OF DAILY LIVING (ADL)
ADLS_ACUITY_SCORE: 30
HYGIENE/GROOMING: INDEPENDENT
ADLS_ACUITY_SCORE: 30
DRESS: INDEPENDENT
ORAL_HYGIENE: INDEPENDENT
ADLS_ACUITY_SCORE: 30
HYGIENE/GROOMING: INDEPENDENT
ADLS_ACUITY_SCORE: 30
DRESS: SCRUBS (BEHAVIORAL HEALTH)
LAUNDRY: UNABLE TO COMPLETE
ADLS_ACUITY_SCORE: 30
ORAL_HYGIENE: INDEPENDENT
ADLS_ACUITY_SCORE: 30

## 2024-08-14 NOTE — PLAN OF CARE
"  Problem: Psychotic Signs/Symptoms  Goal: Improved Behavioral Control (Psychotic Signs/Symptoms)  Outcome: Not Progressing   Goal Outcome Evaluation:       Patient is labile, tense, and irritable. He hit the wall in the in the lounge because he wanted to get a shirt and his phone. He did not have orders to do that at the time. Patient eventually got the orders for clothing and to use his phone to pay bills with supervision. He was given his blue shirt. Writer told patient that staff would help him with his phone usage when they were available. Patient lightly hit the counter at the nursing station and swore. He asked writer about pharmacy symbol on a cup given to him with water. He then said, \"It's a snake and there are no wings on it.\" He then called writer a \"viper.\" Patient got angry when asked about symptoms and yelled, \"No I don't have any fucking voices!\"  This afternoon, patient has been calm. He has watched TV in the lounge, He did not attend any groups this shift. SIO was discontinued this morning.                 "

## 2024-08-14 NOTE — PLAN OF CARE
Upcoming Meetings and Appointments:   Team note: Wednesday     Tasks Complete:  Chart review and met with team, discussed pt progress, symptomology, and response to treatment.  Discussed the discharge plan and any potential impediments to discharge        Discharge Plan or Goal   Plan  TBD discharge home with PHP or IOP . Possibly explore residential.     Care Team   Last appointment scheduled in July with  Jose Maria Adhikari DNP, [facility], [address], [address], [place]  [address]; [phone], [phone] (fax)      Barriers to Discharge   Requires ongoing stabilization     Referral Status  TBD     Legal Status  Voluntary     Next Steps:  Schedule OP appointments and make referral when Peter is closer to baseline

## 2024-08-14 NOTE — PLAN OF CARE
"  Problem: Psychotic Signs/Symptoms  Goal: Improved Behavioral Control (Psychotic Signs/Symptoms)  Outcome: Progressing   Goal Outcome Evaluation:    Plan of Care Reviewed With: patient      Pt was intermittently in the milieu, he walked in the hallway. He presented with flat affect. He frequently sought the writer , somewhat needy. He was observed talking to himself.  He denies pain, SI,HI,SIB and contracts for safety. Endorses anxiety, a some depression. He ate adequately for dinner, during medication administration he angrily took his meds pacing in his room stated \"this is all the medication I'm taking\" threw the med cup on the floor. EKG was normal, fasting labs in the morning. Plan of care on going, will continue to monitor.  Vital signs:  Temp: 97.8  F (36.6  C) Temp src: Temporal BP: 106/72 Pulse: 97   Resp: 16 SpO2: 97 % O2 Device: None (Room air)    "

## 2024-08-14 NOTE — PLAN OF CARE
Pt appeared to sleep 5.5 hours. Pt woke up this morning and presented as agitated and suspicious. Pt laid back down and has been resting. (See previous note). Safety checks done at least every 15 minutes.

## 2024-08-14 NOTE — PLAN OF CARE
BEH IP Unit Acuity Rating Score (UARS)  Patient is given one point for every criteria they meet.    CRITERIA SCORING   On a 72 hour hold, court hold, committed, stay of commitment, or revocation. 0    Patient LOS on BEH unit exceeds 20 days. 0  LOS: 1   Patient under guardianship, 55+, otherwise medically complex, or under age 11. 0   Suicide ideation without relief of precipitating factors. 0   Current plan for suicide. 0   Current plan for homicide. 0   Imminent risk or actual attempt to seriously harm another without relief of factors precipitating the attempt. 0   Severe dysfunction in daily living (ex: complete neglect for self care, extreme disruption in vegetative function, extreme deterioration in social interactions). 1   Recent (last 7 days) or current physical aggression in the ED or on unit. 1   Restraints or seclusion episode in past 72 hours. 0   Recent (last 7 days) or current verbal aggression, agitation, yelling, etc., while in the ED or unit. 1   Active psychosis. 1   Need for constant or near constant redirection (from leaving, from others, etc).  0   Intrusive or disruptive behaviors. 0   Patient requires 3 or more hours of individualized nursing care per 8-hour shift (i.e. for ADLs, meds, therapeutic interventions). 1   TOTAL 5

## 2024-08-14 NOTE — PLAN OF CARE
"Pt appeared to wake up and [facility] to [facility]. Pt initially pleasant with staff and then became agitated while asking sitter about belongings and being in the hospital. Pt raised voice and heard telling sitter \"Shut the fuck up\". Pt then ate an orange, declined prn medication and stated \"I don't know what cocktail you gave me last night, but it wasn't good.\" Pt returned to room and then came back to [facility] after approximately 15 minutes and apologized to writer for being angry. Pt requested nicotine lozenge and reminded of time for lozenges. Pt offered prn for anxiety or agitation and pt declined stating he doesn't want anymore medication after discussing medication with patient. Pt returned to room and appears to be resting at this time. Pt presented as suspicious and agitated.       "

## 2024-08-15 PROCEDURE — 250N000013 HC RX MED GY IP 250 OP 250 PS 637: Performed by: EMERGENCY MEDICINE

## 2024-08-15 PROCEDURE — 250N000013 HC RX MED GY IP 250 OP 250 PS 637: Performed by: REGISTERED NURSE

## 2024-08-15 PROCEDURE — 97150 GROUP THERAPEUTIC PROCEDURES: CPT | Mod: GO

## 2024-08-15 PROCEDURE — 124N000002 HC R&B MH UMMC

## 2024-08-15 PROCEDURE — 99232 SBSQ HOSP IP/OBS MODERATE 35: CPT | Performed by: REGISTERED NURSE

## 2024-08-15 RX ORDER — DIVALPROEX SODIUM 250 MG/1
250 TABLET, DELAYED RELEASE ORAL EVERY 8 HOURS SCHEDULED
Status: DISCONTINUED | OUTPATIENT
Start: 2024-08-15 | End: 2024-08-15

## 2024-08-15 RX ORDER — DIVALPROEX SODIUM 500 MG/1
500 TABLET, DELAYED RELEASE ORAL AT BEDTIME
Status: DISCONTINUED | OUTPATIENT
Start: 2024-08-15 | End: 2024-08-16

## 2024-08-15 RX ORDER — DIVALPROEX SODIUM 250 MG/1
250 TABLET, DELAYED RELEASE ORAL EVERY MORNING
Status: DISCONTINUED | OUTPATIENT
Start: 2024-08-16 | End: 2024-08-16

## 2024-08-15 RX ORDER — DIVALPROEX SODIUM 500 MG/1
500 TABLET, DELAYED RELEASE ORAL EVERY 12 HOURS SCHEDULED
Status: DISCONTINUED | OUTPATIENT
Start: 2024-08-15 | End: 2024-08-15

## 2024-08-15 RX ORDER — LITHIUM CARBONATE 300 MG/1
600 TABLET, FILM COATED, EXTENDED RELEASE ORAL AT BEDTIME
Status: DISCONTINUED | OUTPATIENT
Start: 2024-08-15 | End: 2024-08-16

## 2024-08-15 RX ADMIN — SENNOSIDES AND DOCUSATE SODIUM 1 TABLET: 50; 8.6 TABLET ORAL at 08:40

## 2024-08-15 RX ADMIN — PROPRANOLOL HYDROCHLORIDE 10 MG: 10 TABLET ORAL at 20:48

## 2024-08-15 RX ADMIN — PROPRANOLOL HYDROCHLORIDE 10 MG: 10 TABLET ORAL at 08:40

## 2024-08-15 RX ADMIN — SENNOSIDES AND DOCUSATE SODIUM 1 TABLET: 50; 8.6 TABLET ORAL at 20:49

## 2024-08-15 RX ADMIN — DIVALPROEX SODIUM 250 MG: 250 TABLET, DELAYED RELEASE ORAL at 08:40

## 2024-08-15 RX ADMIN — OLANZAPINE 20 MG: 20 TABLET, ORALLY DISINTEGRATING ORAL at 20:49

## 2024-08-15 RX ADMIN — DIVALPROEX SODIUM 500 MG: 500 TABLET, DELAYED RELEASE ORAL at 20:50

## 2024-08-15 RX ADMIN — LITHIUM CARBONATE 600 MG: 300 TABLET, EXTENDED RELEASE ORAL at 20:49

## 2024-08-15 RX ADMIN — NICOTINE POLACRILEX 2 MG: 2 LOZENGE ORAL at 17:22

## 2024-08-15 RX ADMIN — NICOTINE POLACRILEX 2 MG: 2 LOZENGE ORAL at 18:30

## 2024-08-15 RX ADMIN — NICOTINE POLACRILEX 2 MG: 2 LOZENGE ORAL at 19:48

## 2024-08-15 RX ADMIN — METFORMIN HYDROCHLORIDE 500 MG: 500 TABLET, EXTENDED RELEASE ORAL at 17:48

## 2024-08-15 RX ADMIN — PALIPERIDONE 6 MG: 6 TABLET, EXTENDED RELEASE ORAL at 08:40

## 2024-08-15 RX ADMIN — NICOTINE POLACRILEX 2 MG: 2 LOZENGE ORAL at 13:35

## 2024-08-15 RX ADMIN — NICOTINE POLACRILEX 2 MG: 2 LOZENGE ORAL at 08:53

## 2024-08-15 ASSESSMENT — ACTIVITIES OF DAILY LIVING (ADL)
ADLS_ACUITY_SCORE: 30
ADLS_ACUITY_SCORE: 30
HYGIENE/GROOMING: INDEPENDENT
ADLS_ACUITY_SCORE: 30
DRESS: INDEPENDENT
ADLS_ACUITY_SCORE: 30
ORAL_HYGIENE: INDEPENDENT
DRESS: INDEPENDENT
ADLS_ACUITY_SCORE: 30
ORAL_HYGIENE: INDEPENDENT
ADLS_ACUITY_SCORE: 30
HYGIENE/GROOMING: INDEPENDENT
ADLS_ACUITY_SCORE: 30

## 2024-08-15 NOTE — PLAN OF CARE
BEH IP Unit Acuity Rating Score (UARS)  Patient is given one point for every criteria they meet.    CRITERIA SCORING   On a 72 hour hold, court hold, committed, stay of commitment, or revocation. 0    Patient LOS on BEH unit exceeds 20 days. 0  LOS: 2   Patient under guardianship, 55+, otherwise medically complex, or under age 11. 0   Suicide ideation without relief of precipitating factors. 0   Current plan for suicide. 0   Current plan for homicide. 0   Imminent risk or actual attempt to seriously harm another without relief of factors precipitating the attempt. 0   Severe dysfunction in daily living (ex: complete neglect for self care, extreme disruption in vegetative function, extreme deterioration in social interactions). 1   Recent (last 7 days) or current physical aggression in the ED or on unit. 1   Restraints or seclusion episode in past 72 hours. 0   Recent (last 7 days) or current verbal aggression, agitation, yelling, etc., while in the ED or unit. 1   Active psychosis. 1   Need for constant or near constant redirection (from leaving, from others, etc).  0   Intrusive or disruptive behaviors. 0   Patient requires 3 or more hours of individualized nursing care per 8-hour shift (i.e. for ADLs, meds, therapeutic interventions). 1   TOTAL 5

## 2024-08-15 NOTE — PLAN OF CARE
Upcoming Meetings and Appointments:   Team note: Wednesday     Tasks Complete:  Chart review and met with team, discussed pt progress, symptomology, and response to treatment.  Discussed the discharge plan and any potential impediments to discharge        Discharge Plan or Goal   Plan  TBD discharge home with PHP or IOP . Possibly explore residential.     Care Team   Last appointment scheduled in July with  Jose Maria Adhikari DNP, [facility], [address], [address], [place]  [address]; [phone], [phone] (fax)      Barriers to Discharge   Requires ongoing stabilization     Referral Status  TBD     Legal Status  Voluntary     Next Steps:  Schedule OP appointments and make referral when Peter is closer to baseline

## 2024-08-15 NOTE — PROGRESS NOTES
Rehab Group    Start time: 1015  End time: 1110  Patient time total: 55 minutes    attended full group    #2 attended   Group Type: general health and coping, life skills, self-care, and relaxation   Group Topic Covered: balanced lifestyle, coping skills, emotional regulation, healthy leisure time, relaxation , and self-care     Group Session Detail:       Patient Response/Contribution:  cooperative with task, organized, socially appropriate, safe use of materials/supplies, expressed understanding of topic, and actively engaged     Patient Detail:  I      [id number] OT Group (2 or more in attendance)      Patient Active Problem List   Diagnosis    Bipolar 1 disorder, depressed (H)    Bipolar 1 disorder, mixed, moderate (H)    Bipolar I disorder, most recent episode mixed, severe with psychotic features (H)    Class 3 severe obesity due to excess calories without serious comorbidity with body mass index (BMI) of 40.0 to 44.9 in adult (H)    Flank pain    Former smoker    GERD (gastroesophageal reflux disease)    History of cannabis abuse    History of diabetes insipidus    Insomnia due to other mental disorder    Mixed hyperlipidemia    Severe recurrent major depression with psychotic features, mood-congruent (H)    Suicidal ideation    Vitamin D insufficiency    Bipolar 1 disorder (H)    Bipolar affective disorder, currently manic, moderate (H)

## 2024-08-15 NOTE — PROGRESS NOTES
OT NON-ATTEND NOTE:  Patient has not attended OT Groups. Will be given a self assessment form. OT staff will explain there value of OT, including them in their tx plan and offer options for meeting their needs and identifying goals.

## 2024-08-15 NOTE — PLAN OF CARE
"Problem: Adult Behavioral Health Plan of Care  Goal: Patient-Specific Goal to check bills with cell phone  Outcome: Progressing     Problem: Adult Behavioral Health Plan of Care  Goal: Optimized Coping Skills in Response to Life Stressors  Outcome: Progressing     Goal Outcome Evaluation:    Plan of Care Reviewed With: patient      Pt stated things were going \"better then when I came in.\" Pt agrees to check in with writer if symptoms are bothersome, denied any safety concerns. Pt's affect appears mostly calm, resting in room intermittently. Ate dinner and took metformin. Pt asked to use cell phone and explained this was his biggest concern. RN was able to offer supervised time and pt made appropriate use and checked on a bank transaction. Pt asked about length of stay in an appropriate manner. Pt was encouraged to come out of room for medication which he did. Took night medication (he was aware of starting Depakote) and was cooperative with cheeking precautions. Asked about mirtazapine, explained indication (pt declined handout).     /82   Pulse 88   Temp 97.1  F (36.2  C) (Temporal)   Resp 16   Ht 1.753 m (5' 9\")   Wt 134.5 kg (296 lb 9.6 oz)   SpO2 97%   BMI 43.80 kg/m        "

## 2024-08-15 NOTE — PROGRESS NOTES
"[facility], [place]   Psychiatric Progress Note    Hospital Day: 2  Interim History:   From H&P:  This patient is a 38 year old male with a history of  Bipolar 1 disorder, ADHD, insomnia, suicidal ideation, depression, anxiety, and head injuries, who presented to [facility] ED on 8/9/2024 with psychosis and priti in the context of suspected medication nonadherence.     Team meeting report:  The patient's care was discussed with the treatment team during the daily team meeting and staff's chart notes were reviewed.  Patient was tense and irritable on day shift.  He lightly hit the counter at the nurses station and swore.  He was able to be redirected.  During evening shift, patient reported that things were going better.  No behavioral issues or concerns overnight.  Per staff documentation, patient slept 6.75 hours overnight.    Met with patient.   Today, patient is calm and cooperative on exam.  He reports he slept well last night.  He feels rested this morning.  He is tolerating Depakote and mirtazapine well.  Patient states that initiating Depakote appears to have been beneficial.  He would like to continue tapering lithium.  He continues to endorse some anxiety.  He tells provider he needs to practice his \"breathing\".  He denies feeling agitated or irritable today.  Patient states his father would like to visit him.  Patient denies low moods, hallucinations, or paranoia.  His goal for the day is to attend at least one group.  Patient denies pain or physical complaints today.    Medications:     Current Facility-Administered Medications   Medication Dose Route Frequency Provider Last Rate Last Admin    divalproex sodium delayed-release (DEPAKOTE) DR tablet 250 mg  250 mg Oral Q12H [facility] (08/20) Geneva Corley APRN CNP   250 mg at 08/15/24 0840    lithium (ESKALITH) CR half-tab 975 mg  975 mg Oral At Bedtime Geneva Corley APRN CNP   975 mg at 08/14/24 2100    metFORMIN " (GLUCOPHAGE XR) 24 hr tablet 500 mg  500 mg Oral Daily with Carlos Estrada MD   500 mg at 08/14/24 1750    mirtazapine (REMERON) tablet TABS 7.5 mg  7.5 mg Oral At Bedtime Geneva Corley APRN CNP   7.5 mg at 08/14/24 2100    OLANZapine zydis (zyPREXA) ODT tab 20 mg  20 mg Oral At Bedtime Geneva Corley APRN CNP   20 mg at 08/14/24 2101    paliperidone ER (INVEGA) 24 hr tablet 6 mg  6 mg Oral Daily Geneva Corley APRN CNP   6 mg at 08/15/24 0840    propranolol (INDERAL) tablet 10 mg  10 mg Oral TID Geneva Corley APRN CNP   10 mg at 08/15/24 0840    senna-docusate (SENOKOT-S/PERICOLACE) 8.6-50 MG per tablet 1 tablet  1 tablet Oral BID Geneva Corley APRN CNP   1 tablet at 08/15/24 0840     Allergies:     Allergies   Allergen Reactions    No Known Drug Allergy      Labs:     Recent Results (from the past 672 hour(s))   Urine Drug Screen Panel    Collection Time: 08/09/24  2:25 PM   Result Value Ref Range    Amphetamines Urine Screen Negative Screen Negative    Barbituates Urine Screen Negative Screen Negative    Benzodiazepine Urine Screen Negative Screen Negative    Cannabinoids Urine Screen Negative Screen Negative    Cocaine Urine Screen Negative Screen Negative    Fentanyl Qual Urine Screen Negative Screen Negative    Opiates Urine Screen Negative Screen Negative    PCP Urine Screen Negative Screen Negative   CBC with platelets and differential    Collection Time: 08/09/24  3:33 PM   Result Value Ref Range    WBC Count 11.9 (H) 4.0 - 11.0 10e3/uL    RBC Count 4.76 4.40 - 5.90 10e6/uL    Hemoglobin 14.2 13.3 - 17.7 g/dL    Hematocrit 41.3 40.0 - 53.0 %    MCV 87 78 - 100 fL    MCH 29.8 26.5 - 33.0 pg    MCHC 34.4 31.5 - 36.5 g/dL    RDW 12.6 10.0 - 15.0 %    Platelet Count 243 150 - 450 10e3/uL    % Neutrophils 66 %    % Lymphocytes 26 %    % Monocytes 5 %    % Eosinophils 2 %    % Basophils 1 %    % Immature Granulocytes 0 %    NRBCs per 100 WBC 0 <1 /100    Absolute  Neutrophils 7.9 1.6 - 8.3 10e3/uL    Absolute Lymphocytes 3.1 0.8 - 5.3 10e3/uL    Absolute Monocytes 0.6 0.0 - 1.3 10e3/uL    Absolute Eosinophils 0.2 0.0 - 0.7 10e3/uL    Absolute Basophils 0.1 0.0 - 0.2 10e3/uL    Absolute Immature Granulocytes 0.0 <=0.4 10e3/uL    Absolute NRBCs 0.0 10e3/uL   Hemoglobin A1c    Collection Time: 08/09/24  3:33 PM   Result Value Ref Range    Hemoglobin A1C 6.4 (H) <5.7 %   Lithium level    Collection Time: 08/09/24  3:39 PM   Result Value Ref Range    Lithium 1.08 0.60 - 1.20 mmol/L   Comprehensive metabolic panel    Collection Time: 08/09/24  4:14 PM   Result Value Ref Range    Sodium 136 135 - 145 mmol/L    Potassium 3.5 3.4 - 5.3 mmol/L    Carbon Dioxide (CO2) 24 22 - 29 mmol/L    Anion Gap 14 7 - 15 mmol/L    Urea Nitrogen 12.3 6.0 - 20.0 mg/dL    Creatinine 0.83 0.67 - 1.17 mg/dL    GFR Estimate >90 >60 mL/min/1.73m2    Calcium 10.5 (H) 8.8 - 10.4 mg/dL    Chloride 98 98 - 107 mmol/L    Glucose 170 (H) 70 - 99 mg/dL    Alkaline Phosphatase 75 40 - 150 U/L    AST 25 0 - 45 U/L    ALT 39 0 - 70 U/L    Protein Total 7.8 6.4 - 8.3 g/dL    Albumin 4.7 3.5 - 5.2 g/dL    Bilirubin Total 0.6 <=1.2 mg/dL   TSH with free T4 reflex    Collection Time: 08/09/24  4:14 PM   Result Value Ref Range    TSH 2.07 0.30 - 4.20 uIU/mL   Asymptomatic COVID-19 Virus (Coronavirus) by PCR Nasopharyngeal    Collection Time: 08/10/24  8:40 AM    Specimen: Nasopharyngeal; Swab   Result Value Ref Range    SARS CoV2 PCR Negative Negative   EKG 12-lead, complete    Collection Time: 08/13/24  5:06 PM   Result Value Ref Range    Systolic Blood Pressure  mmHg    Diastolic Blood Pressure  mmHg    Ventricular Rate 80 BPM    Atrial Rate 80 BPM    NH Interval 156 ms    QRS Duration 90 ms     ms    QTc 431 ms    P Axis 45 degrees    R AXIS 1 degrees    T Axis 21 degrees    Interpretation ECG       Sinus rhythm  Moderate voltage criteria for LVH, may be normal variant  Borderline ECG  No previous ECGs  "available  Confirmed by MD SHIRLEY, LEXIE ([id number]) on 8/14/2024 11:41:50 PM     Vitamin D Deficiency    Collection Time: 08/14/24  8:02 AM   Result Value Ref Range    Vitamin D, Total (25-Hydroxy) 32 20 - 50 ng/mL   Vitamin B12    Collection Time: 08/14/24  8:02 AM   Result Value Ref Range    Vitamin B12 865 232 - 1,245 pg/mL   Folate    Collection Time: 08/14/24  8:02 AM   Result Value Ref Range    Folic Acid 16.4 4.6 - 34.8 ng/mL   Lipid panel reflex to direct LDL    Collection Time: 08/14/24  8:02 AM   Result Value Ref Range    Cholesterol 143 <200 mg/dL    Triglycerides 223 (H) <150 mg/dL    Direct Measure HDL 25 (L) >=40 mg/dL    LDL Cholesterol Calculated 73 <=100 mg/dL    Non HDL Cholesterol 118 <130 mg/dL   Extra Purple Top Tube    Collection Time: 08/14/24  8:02 AM   Result Value Ref Range    Hold Specimen JIC        Psychiatric Examination:   Temp: 97.1  F (36.2  C) Temp src: Temporal BP: 128/84 Pulse: 78   Resp: 16 SpO2: 97 % O2 Device: None (Room air)    Weight is 296 lbs 9.6 oz  Body mass index is 43.8 kg/m .    Appearance:  Awake, alert, adequately groomed, dressed in hospital scrubs with shirt from home.  Attitude: Calm, cooperative  Eye Contact:  good  Mood:  \"A little anxious\"  Affect:   More relaxed, still appears mildly tense and suspicious  Speech:  Clear, coherent, appropriate rate and volume  Psychomotor Behavior:  No psychomotor agitation noted  Thought Process:  linear  Associations:  no loose associations  Thought Content:  no evidence of suicidal ideation or homicidal ideation and patient appears to be responding to internal stimuli  Insight:  fair  Judgement:  fair  Oriented to:  Not formally tested, grossly intact.  Attention Span and Concentration: Fair, able to engage in interview.  Recent and Remote Memory:  Not formally tested, adequate for interview.    Precautions:     Behavioral Orders   Procedures    Assault precautions    Cheeking Precautions (behavioral units)    Code 1 - Restrict " "to Unit    Routine Programming     As clinically indicated    Status 15     Every 15 minutes.    Suicide precautions: Suicide Risk: HIGH     Patients on Suicide Precautions should have a Combination Diet ordered that includes a Diet selection(s) AND a Behavioral Tray selection for Safe Tray - with utensils, or Safe Tray - NO utensils       Order Specific Question:   Suicide Risk     Answer:   HIGH     Diagnoses:     Primary Diagnoses:  Bipolar 1 disorder, current episode manic, vs Schizophrenia vs Schizoaffective d/o  Medication nonadherence     Secondary Diagnosis:   Bipolar 1 disorder, per chart  R/o schizophrenia, per chart  ADHD, per chart  Vitamin D deficiency, per chart  Anxiety, per chart  Insomnia, per chart  Cannabis use, per chart    Clinically Significant Risk Factors Present on Admission        # Severe Obesity: Estimated body mass index is 43.8 kg/m  as calculated from the following:    Height as of this encounter: 1.753 m (5' 9\").    Weight as of this encounter: 134.5 kg (296 lb 9.6 oz)., PRESENT ON ADMISSION         Hospital Course:     Plan on Admission:  Admit to [address]   Legal Status: Voluntary   Continue PTA Zyprexa 20mg HS, Lithium 1200mg HS, Metformin 500mg PM, PRN propranolol 10mg TID, and PRN Haldol 5mg BID.  PTA Ambien was not initiated on admission.  Start Invega 3mg AM, mirtazapine 7.5mg HS, PRN hydroxyzine, PRN Zyprexa, PRN trazodone, PRN NRT.    Start Safety precautions:  suicide, cheeking, elopement, assault.  Order additional Labs: TSH, lipids, HgbA1c, B12/folate, vitamin D.  Order EKG   Start SIO 1:1 for verbal aggression/assault risk and intrusiveness.     8/14:  Discontinue SIO.  Increase Invega to 6 mg.  Patient would like to discontinue lithium.  Will cross-titrate Depakote and lithium.  Decrease lithium to 975 mg at bedtime and initiate Depakote  mg twice daily.  Schedule as needed propranolol and senna.  Will discontinue as needed hydroxyzine due to patient " reports of no benefit.  Will initiate as needed gabapentin for anxiety.  8/15: Patient reports improvement in agitation and irritability with initiation of Depakote yesterday.  He would like to continue cross titration of Depakote and lithium.  Will increase Depakote to 500 mg at bedtime.  Will decrease lithium to 600 mg.    Plan:   Today's Changes:  -Increase Depakote DR at bedtime to 500 mg.  -Decrease lithium to 600 mg at bedtime.    Medications:  Target psychiatric symptoms and interventions:  Target psychiatric symptoms and interventions:  # Psychosis   - Continue Invega 6mg daily. Plan to transition to Invega Sustenna.  - Continue Zyprexa 20mg HS.     # Mood  - Decrease lithium to 600 mg HS.  - Continue Depakote  mg every morning.  - Increase Depakote DR at bedtime to 500 mg.  - Continue mirtazapine 7.5mg HS.     # Anxiety   - Continue propranolol 10 mg 3 times daily.  - Continue gabapentin 100 mg 3 times daily as needed.    # Insomnia  - Mirtazapine as above.     # Agitation  - Continue Haldol 5mg BID PRN.   - Continue Zyprexa 10mg TID PRN for severe agitation.     # Nicotine withdrawal  - Continue Commit 2-4mg every hour PRN.     # Tremor  - Continue propranolol as above.     Risks, benefits, and alternatives discussed at length with patient.     Disposition:    Patient will likely discharge to his father's home, where he currently resides.  He would likely benefit from IOP.  He would likely benefit from therapy focused on development of life skills and coping skills.  Assume patient will [facility] for outpatient psychiatric medication management.     Reason for ongoing admission:  patient is unable to care for self due to severe psychosis or priti.      Estimated length of stay:  7-14 days.    Medical:  --Internal medicine to follow up for medical problems     Pertinent labs/imaging:   Latest Reference Range & Units 08/14/24 08:02   Cholesterol <200 mg/dL 143   Folate 4.6 - 34.8  ng/mL 16.4   HDL Cholesterol >=40 mg/dL 25 (L)   LDL Cholesterol Calculated <=100 mg/dL 73   Non HDL Cholesterol <130 mg/dL 118   Triglycerides <150 mg/dL 223 (H)   Vitamin B12 232 - 1,245 pg/mL 865   Vitamin D, Total (25-Hydroxy) 20 - 50 ng/mL 32   (L): Data is abnormally low  (H): Data is abnormally high    Consults:   --Care was coordinated with the treatment team.   --The patient was consulted on nature of illness and treatment options.        ODILIA Mckeon, CNP     This note was created with the help of Dragon dictation system. All grammatical/typing errors or context distortion are unintentional and inherent to software.    Attestation:  Patient has been seen and evaluated by me, ODILIA Mckeon, CNP.  I spent >35 min spent on the date of the encounter in chart review, patient visit, review of tests, documentation, care coordination, and/or discussion with other providers about the issues documented above.

## 2024-08-15 NOTE — PLAN OF CARE
"Problem: Psychotic Signs/Symptoms  Goal: Increased Participation and Engagement (Psychotic Signs/Symptoms)  Outcome: Progressing  Intervention: Facilitate Participation and Engagement  Recent Flowsheet Documentation  Taken 8/15/2024 1028 by Gala Dobbs RN  Supportive Measures: self-care encouraged   Goal Outcome Evaluation:     Plan of Care Reviewed With: patient       Patient was pleasant this morning. He has been calm this shift. He attended OT group this morning. He was compliant with all scheduled medications. Patient said he was \"a little\" depressed and anxious. He said he was anxious about \"what happens with my living situation\" when he discharges from here. He denied suicidal thoughts. He denied hallucinations. He said he enjoyed OT group. He said he was a little tired and laid down after lunch. No mood lability. His nutrition has been adequate. No complaints of pain or physical discomfort. PRN nicotine lozenge given. Patient refused 1400 Propranolol because he thinks it's making him feel tired.           "

## 2024-08-15 NOTE — PLAN OF CARE
"Problem: Psychotic Signs/Symptoms  Goal: Improved Mood Symptoms (Psychotic Signs/Symptoms)  Outcome: Progressing     Goal Outcome Evaluation:    Plan of Care Reviewed With: patient      Pt appears calm on unit, blunted affect. Denied any concerns except reports an \"electric\" sensation in both wrists which he attributes to starting new medications. Endorses depression and anxiety, declined any PRNs. Pt sang and played guitar in lounge area. Watched TV in lounge. Ate dinner. Requested night medications at 9 pm. Administered medications and cooperative with checking precautions. Pt declined mirtazapine and would like to discuss this further with his provider. Pt expressed he would ask for the medication if he cannot sleep.    /81 (BP Location: Left arm, Patient Position: Sitting, Cuff Size: Adult Large)   Pulse 88   Temp 97.3  F (36.3  C) (Temporal)   Resp 16   Ht 1.753 m (5' 9\")   Wt 133.6 kg (294 lb 9.6 oz)   SpO2 97%   BMI 43.50 kg/m          "

## 2024-08-15 NOTE — PROGRESS NOTES
Rehab Group    Start time: 1015  End time: 1110  Patient time total: 55 minutes    attended full group    #2 attended   Group Type: general health and coping, life skills, self-care, and relaxation   Group Topic Covered: balanced lifestyle, coping skills, emotional regulation, healthy leisure time, relaxation , and self-care     Group Session Detail:  Participated in a stress management/coping collage to support discussion surrounding stress, stress management/ positive healthy self management for improved life/ self management.      Patient Response/Contribution:  cooperative with task, organized, socially appropriate, safe use of materials/supplies, expressed understanding of topic, and actively engaged     Patient Detail:  Initiated group following overhead announcement. Readily participated in collaging activity focused on positive coping and stress management activities and comforts. Identified baking and sports as comforting activities/entertainment. Socially did not listen to others when they were speaking and often interrupted others. [place] thoughts with inability or lack of interest in expanding his coping/self management skills. Completed activity and shared it with peers.      [id number] OT Group (2 or more in attendance)      Patient Active Problem List   Diagnosis    Bipolar 1 disorder, depressed (H)    Bipolar 1 disorder, mixed, moderate (H)    Bipolar I disorder, most recent episode mixed, severe with psychotic features (H)    Class 3 severe obesity due to excess calories without serious comorbidity with body mass index (BMI) of 40.0 to 44.9 in adult (H)    Flank pain    Former smoker    GERD (gastroesophageal reflux disease)    History of cannabis abuse    History of diabetes insipidus    Insomnia due to other mental disorder    Mixed hyperlipidemia    Severe recurrent major depression with psychotic features, mood-congruent (H)    Suicidal ideation    Vitamin D insufficiency    Bipolar 1 disorder  (H)    Bipolar affective disorder, currently manic, moderate (H)

## 2024-08-15 NOTE — PROGRESS NOTES
Rehab Group    Start time: 1315  End time: 1400  Patient time total: 45 minutes    Attended partial group    #4 attended   Group Type: general health and coping and recreation   Group Topic Covered: balanced lifestyle, coping skills, healthy leisure time, problem solving, and social skills     Group Session Detail:  Education was provided regarding the reasoning for utilizing games as therapeutic modalities and the benefits of engagement. Patient actively engaged in therapeutic game (Sequence) in order to: improve social skills, encourage new learning, leisure exploration, exercise cognitive skills, improve concentration, and encourage healthy leisure engagements.      Patient Response/Contribution:  cooperative with task, organized, socially appropriate, requested more information on topic, attentive, actively engaged, and used humor appropriately     Patient Detail:  Arrived to group late but picked up on multi step verbal directions fairly easily. Attentive to details and planned a strategy early on. Some interaction with other, more so after his team won the game. Mood was calm and affect was congruent.       [id number] OT Group (2 or more in attendance)      Patient Active Problem List   Diagnosis    Bipolar 1 disorder, depressed (H)    Bipolar 1 disorder, mixed, moderate (H)    Bipolar I disorder, most recent episode mixed, severe with psychotic features (H)    Class 3 severe obesity due to excess calories without serious comorbidity with body mass index (BMI) of 40.0 to 44.9 in adult (H)    Flank pain    Former smoker    GERD (gastroesophageal reflux disease)    History of cannabis abuse    History of diabetes insipidus    Insomnia due to other mental disorder    Mixed hyperlipidemia    Severe recurrent major depression with psychotic features, mood-congruent (H)    Suicidal ideation    Vitamin D insufficiency    Bipolar 1 disorder (H)    Bipolar affective disorder, currently manic, moderate (H)

## 2024-08-15 NOTE — PROGRESS NOTES
NOC Shift Report    Pt in bed at beginning of shift, breathing quiet and unlabored. Pt slept 6.75 hours. Pt is on status 15 min checks.

## 2024-08-16 PROCEDURE — 124N000002 HC R&B MH UMMC

## 2024-08-16 PROCEDURE — 250N000013 HC RX MED GY IP 250 OP 250 PS 637: Performed by: REGISTERED NURSE

## 2024-08-16 PROCEDURE — 250N000013 HC RX MED GY IP 250 OP 250 PS 637: Performed by: EMERGENCY MEDICINE

## 2024-08-16 PROCEDURE — 99232 SBSQ HOSP IP/OBS MODERATE 35: CPT | Performed by: REGISTERED NURSE

## 2024-08-16 RX ORDER — HYDROCORTISONE 2.5 %
CREAM (GRAM) TOPICAL 2 TIMES DAILY PRN
Status: DISCONTINUED | OUTPATIENT
Start: 2024-08-16 | End: 2024-08-22 | Stop reason: HOSPADM

## 2024-08-16 RX ORDER — LITHIUM CARBONATE 300 MG/1
300 TABLET, FILM COATED, EXTENDED RELEASE ORAL AT BEDTIME
Status: DISCONTINUED | OUTPATIENT
Start: 2024-08-16 | End: 2024-08-19

## 2024-08-16 RX ORDER — DIVALPROEX SODIUM 500 MG/1
500 TABLET, DELAYED RELEASE ORAL AT BEDTIME
Status: COMPLETED | OUTPATIENT
Start: 2024-08-16 | End: 2024-08-16

## 2024-08-16 RX ORDER — MIRTAZAPINE 7.5 MG/1
7.5 TABLET, FILM COATED ORAL
Status: DISCONTINUED | OUTPATIENT
Start: 2024-08-16 | End: 2024-08-22 | Stop reason: HOSPADM

## 2024-08-16 RX ORDER — PROPRANOLOL HYDROCHLORIDE 10 MG/1
10 TABLET ORAL 3 TIMES DAILY PRN
Status: DISCONTINUED | OUTPATIENT
Start: 2024-08-16 | End: 2024-08-22 | Stop reason: HOSPADM

## 2024-08-16 RX ADMIN — LITHIUM CARBONATE 300 MG: 300 TABLET, EXTENDED RELEASE ORAL at 21:07

## 2024-08-16 RX ADMIN — SENNOSIDES AND DOCUSATE SODIUM 1 TABLET: 50; 8.6 TABLET ORAL at 21:07

## 2024-08-16 RX ADMIN — DIVALPROEX SODIUM 250 MG: 250 TABLET, DELAYED RELEASE ORAL at 09:04

## 2024-08-16 RX ADMIN — NICOTINE POLACRILEX 2 MG: 2 LOZENGE ORAL at 09:06

## 2024-08-16 RX ADMIN — PROPRANOLOL HYDROCHLORIDE 10 MG: 10 TABLET ORAL at 09:03

## 2024-08-16 RX ADMIN — PALIPERIDONE 6 MG: 6 TABLET, EXTENDED RELEASE ORAL at 09:03

## 2024-08-16 RX ADMIN — DIVALPROEX SODIUM 500 MG: 500 TABLET, DELAYED RELEASE ORAL at 21:06

## 2024-08-16 RX ADMIN — METFORMIN HYDROCHLORIDE 500 MG: 500 TABLET, EXTENDED RELEASE ORAL at 17:52

## 2024-08-16 RX ADMIN — NICOTINE POLACRILEX 2 MG: 2 LOZENGE ORAL at 18:21

## 2024-08-16 RX ADMIN — OLANZAPINE 20 MG: 20 TABLET, ORALLY DISINTEGRATING ORAL at 21:07

## 2024-08-16 RX ADMIN — SENNOSIDES AND DOCUSATE SODIUM 1 TABLET: 50; 8.6 TABLET ORAL at 09:03

## 2024-08-16 RX ADMIN — GABAPENTIN 100 MG: 100 CAPSULE ORAL at 16:15

## 2024-08-16 RX ADMIN — NICOTINE POLACRILEX 4 MG: 2 LOZENGE ORAL at 16:15

## 2024-08-16 ASSESSMENT — ACTIVITIES OF DAILY LIVING (ADL)
ADLS_ACUITY_SCORE: 30
ADLS_ACUITY_SCORE: 30
DRESS: INDEPENDENT
ADLS_ACUITY_SCORE: 30
HYGIENE/GROOMING: INDEPENDENT
ADLS_ACUITY_SCORE: 30
LAUNDRY: UNABLE TO COMPLETE
ORAL_HYGIENE: INDEPENDENT
ADLS_ACUITY_SCORE: 30
DRESS: INDEPENDENT
ADLS_ACUITY_SCORE: 30
ORAL_HYGIENE: INDEPENDENT
ADLS_ACUITY_SCORE: 30
HYGIENE/GROOMING: INDEPENDENT
ADLS_ACUITY_SCORE: 30

## 2024-08-16 NOTE — PROGRESS NOTES
[facility], [place]   Psychiatric Progress Note    Hospital Day: 3  Interim History:   From H&P:  This patient is a 38 year old male with a history of  Bipolar 1 disorder, ADHD, insomnia, suicidal ideation, depression, anxiety, and head injuries, who presented to [facility] ED on 8/9/2024 with psychosis and priti in the context of suspected medication nonadherence.     Team meeting report:  The patient's care was discussed with the treatment team during the daily team meeting and staff's chart notes were reviewed.  Patient was calm with a blunted affect.  He reported electric sensations in his hands, which he believes is a side effect from his medications.  He endorsed depression and anxiety.  He is sang and played the [employer].  He attended group.  He declined mirtazapine last night.  Per staff documentation, patient slept 6.75 hours overnight.    Met with patient.   Today, patient is calm and cooperative on exam.  He is resting in bed, and states he is tired from one of the medications.  Provider discusses consolidating Depakote to bedtime to avoid daytime sedation.  Patient is agreeable with this.  No mention today of electric sensations in his hands.  Patient denies suicidal ideation or homicidal ideation.  He reports he is feeling better.  He states he is still having anxious thoughts, but they are improved.  He denies hallucinations or paranoia.  He states his irritability is better.  Patient has a rash on his bilateral cheeks and nose.  It appears to be dermatitis.  Will order as needed hydrocortisone cream.  Patient does not believe he needs mirtazapine, as he has been sleeping well.  He would like to change it to as needed.  Patient would like to continue with lithium taper.  Patient would also like to make propranolol as needed instead of scheduled.  Patient would like a referral for outpatient psychiatric provider.  Patient is no longer interested in Invega MCGOVERN, and requested to  discontinue it today.  Given improvement in patient's symptoms on Zyprexa and Depakote, will discontinue Invega today per patient request.    Medications:     Current Facility-Administered Medications   Medication Dose Route Frequency Provider Last Rate Last Admin    divalproex sodium delayed-release (DEPAKOTE) DR tablet 500 mg  500 mg Oral At Bedtime Geneva Corley APRN CNP        [START ON 8/17/2024] divalproex sodium delayed-release (DEPAKOTE) DR tablet 750 mg  750 mg Oral At Bedtime Geneva Corley APRN CNP        lithium ER (LITHOBID) CR tablet 300 mg  300 mg Oral At Bedtime Geneva Corley APRN CNP        metFORMIN (GLUCOPHAGE XR) 24 hr tablet 500 mg  500 mg Oral Daily with supper Carlos Urbina MD   500 mg at 08/16/24 1752    OLANZapine zydis (zyPREXA) ODT tab 20 mg  20 mg Oral At Bedtime Geneva Corley APRN CNP   20 mg at 08/15/24 2049    senna-docusate (SENOKOT-S/PERICOLACE) 8.6-50 MG per tablet 1 tablet  1 tablet Oral BID Geneva Corley APRN CNP   1 tablet at 08/16/24 0903     Allergies:     Allergies   Allergen Reactions    No Known Drug Allergy      Labs:     Recent Results (from the past 672 hour(s))   Urine Drug Screen Panel    Collection Time: 08/09/24  2:25 PM   Result Value Ref Range    Amphetamines Urine Screen Negative Screen Negative    Barbituates Urine Screen Negative Screen Negative    Benzodiazepine Urine Screen Negative Screen Negative    Cannabinoids Urine Screen Negative Screen Negative    Cocaine Urine Screen Negative Screen Negative    Fentanyl Qual Urine Screen Negative Screen Negative    Opiates Urine Screen Negative Screen Negative    PCP Urine Screen Negative Screen Negative   CBC with platelets and differential    Collection Time: 08/09/24  3:33 PM   Result Value Ref Range    WBC Count 11.9 (H) 4.0 - 11.0 10e3/uL    RBC Count 4.76 4.40 - 5.90 10e6/uL    Hemoglobin 14.2 13.3 - 17.7 g/dL    Hematocrit 41.3 40.0 - 53.0 %    MCV 87 78 - 100 fL    MCH  29.8 26.5 - 33.0 pg    MCHC 34.4 31.5 - 36.5 g/dL    RDW 12.6 10.0 - 15.0 %    Platelet Count 243 150 - 450 10e3/uL    % Neutrophils 66 %    % Lymphocytes 26 %    % Monocytes 5 %    % Eosinophils 2 %    % Basophils 1 %    % Immature Granulocytes 0 %    NRBCs per 100 WBC 0 <1 /100    Absolute Neutrophils 7.9 1.6 - 8.3 10e3/uL    Absolute Lymphocytes 3.1 0.8 - 5.3 10e3/uL    Absolute Monocytes 0.6 0.0 - 1.3 10e3/uL    Absolute Eosinophils 0.2 0.0 - 0.7 10e3/uL    Absolute Basophils 0.1 0.0 - 0.2 10e3/uL    Absolute Immature Granulocytes 0.0 <=0.4 10e3/uL    Absolute NRBCs 0.0 10e3/uL   Hemoglobin A1c    Collection Time: 08/09/24  3:33 PM   Result Value Ref Range    Hemoglobin A1C 6.4 (H) <5.7 %   Lithium level    Collection Time: 08/09/24  3:39 PM   Result Value Ref Range    Lithium 1.08 0.60 - 1.20 mmol/L   Comprehensive metabolic panel    Collection Time: 08/09/24  4:14 PM   Result Value Ref Range    Sodium 136 135 - 145 mmol/L    Potassium 3.5 3.4 - 5.3 mmol/L    Carbon Dioxide (CO2) 24 22 - 29 mmol/L    Anion Gap 14 7 - 15 mmol/L    Urea Nitrogen 12.3 6.0 - 20.0 mg/dL    Creatinine 0.83 0.67 - 1.17 mg/dL    GFR Estimate >90 >60 mL/min/1.73m2    Calcium 10.5 (H) 8.8 - 10.4 mg/dL    Chloride 98 98 - 107 mmol/L    Glucose 170 (H) 70 - 99 mg/dL    Alkaline Phosphatase 75 40 - 150 U/L    AST 25 0 - 45 U/L    ALT 39 0 - 70 U/L    Protein Total 7.8 6.4 - 8.3 g/dL    Albumin 4.7 3.5 - 5.2 g/dL    Bilirubin Total 0.6 <=1.2 mg/dL   TSH with free T4 reflex    Collection Time: 08/09/24  4:14 PM   Result Value Ref Range    TSH 2.07 0.30 - 4.20 uIU/mL   Asymptomatic COVID-19 Virus (Coronavirus) by PCR Nasopharyngeal    Collection Time: 08/10/24  8:40 AM    Specimen: Nasopharyngeal; Swab   Result Value Ref Range    SARS CoV2 PCR Negative Negative   EKG 12-lead, complete    Collection Time: 08/13/24  5:06 PM   Result Value Ref Range    Systolic Blood Pressure  mmHg    Diastolic Blood Pressure  mmHg    Ventricular Rate 80 BPM     "Atrial Rate 80 BPM    NM Interval 156 ms    QRS Duration 90 ms     ms    QTc 431 ms    P Axis 45 degrees    R AXIS 1 degrees    T Axis 21 degrees    Interpretation ECG       Sinus rhythm  Moderate voltage criteria for LVH, may be normal variant  Borderline ECG  No previous ECGs available  Confirmed by MD SHIRLEY, LEXIE ([id number]) on 8/14/2024 11:41:50 PM     Vitamin D Deficiency    Collection Time: 08/14/24  8:02 AM   Result Value Ref Range    Vitamin D, Total (25-Hydroxy) 32 20 - 50 ng/mL   Vitamin B12    Collection Time: 08/14/24  8:02 AM   Result Value Ref Range    Vitamin B12 865 232 - 1,245 pg/mL   Folate    Collection Time: 08/14/24  8:02 AM   Result Value Ref Range    Folic Acid 16.4 4.6 - 34.8 ng/mL   Lipid panel reflex to direct LDL    Collection Time: 08/14/24  8:02 AM   Result Value Ref Range    Cholesterol 143 <200 mg/dL    Triglycerides 223 (H) <150 mg/dL    Direct Measure HDL 25 (L) >=40 mg/dL    LDL Cholesterol Calculated 73 <=100 mg/dL    Non HDL Cholesterol 118 <130 mg/dL   Extra Purple Top Tube    Collection Time: 08/14/24  8:02 AM   Result Value Ref Range    Hold Specimen JIC        Psychiatric Examination:   Temp: 98.8  F (37.1  C) Temp src: Oral BP: 129/84 Pulse: 86   Resp: 18 SpO2: 96 % O2 Device: None (Room air)    Weight is 294 lbs 9.6 oz  Body mass index is 43.5 kg/m .    Appearance:  Awake, alert, adequately groomed, dressed in hospital scrubs with shirt from home.  Attitude: Calm, cooperative  Eye Contact:  good  Mood:  \"I'm feeling better\"  Affect:   More relaxed, still appears mildly tense and suspicious  Speech:  Clear, coherent, appropriate rate and volume  Psychomotor Behavior:  No psychomotor agitation noted  Thought Process:  linear  Associations:  no loose associations  Thought Content:  no evidence of suicidal ideation or homicidal ideation and patient appears to be responding to internal stimuli  Insight:  fair  Judgement:  fair  Oriented to:  Not formally tested, grossly " "intact.  Attention Span and Concentration: Fair, able to engage in interview.  Recent and Remote Memory:  Not formally tested, adequate for interview.    Precautions:     Behavioral Orders   Procedures    Assault precautions    Cheeking Precautions (behavioral units)    Code 1 - Restrict to Unit    Routine Programming     As clinically indicated    Status 15     Every 15 minutes.    Suicide precautions: Suicide Risk: HIGH     Patients on Suicide Precautions should have a Combination Diet ordered that includes a Diet selection(s) AND a Behavioral Tray selection for Safe Tray - with utensils, or Safe Tray - NO utensils       Order Specific Question:   Suicide Risk     Answer:   HIGH     Diagnoses:     Primary Diagnoses:  Bipolar 1 disorder, current episode manic, vs Schizophrenia vs Schizoaffective d/o  Medication nonadherence     Secondary Diagnosis:   Bipolar 1 disorder, per chart  R/o schizophrenia, per chart  ADHD, per chart  Vitamin D deficiency, per chart  Anxiety, per chart  Insomnia, per chart  Cannabis use, per chart    Clinically Significant Risk Factors Present on Admission        # Severe Obesity: Estimated body mass index is 43.5 kg/m  as calculated from the following:    Height as of this encounter: 1.753 m (5' 9\").    Weight as of this encounter: 133.6 kg (294 lb 9.6 oz)., PRESENT ON ADMISSION         Hospital Course:     Plan on Admission:  Admit to [address]   Legal Status: Voluntary   Continue PTA Zyprexa 20mg HS, Lithium 1200mg HS, Metformin 500mg PM, PRN propranolol 10mg TID, and PRN Haldol 5mg BID.  PTA Ambien was not initiated on admission.  Start Invega 3mg AM, mirtazapine 7.5mg HS, PRN hydroxyzine, PRN Zyprexa, PRN trazodone, PRN NRT.    Start Safety precautions:  suicide, cheeking, elopement, assault.  Order additional Labs: TSH, lipids, HgbA1c, B12/folate, vitamin D.  Order EKG   Start SIO 1:1 for verbal aggression/assault risk and intrusiveness.     8/14:  Discontinue SIO.  Increase " Invega to 6 mg.  Patient would like to discontinue lithium.  Will cross-titrate Depakote and lithium.  Decrease lithium to 975 mg at bedtime and initiate Depakote  mg twice daily.  Schedule as needed propranolol and senna.  Will discontinue as needed hydroxyzine due to patient reports of no benefit.  Will initiate as needed gabapentin for anxiety.  8/15: Patient reports improvement in agitation and irritability with initiation of Depakote yesterday.  He would like to continue cross titration of Depakote and lithium.  Will increase Depakote to 500 mg at bedtime.  Will decrease lithium to 600 mg.  8/16:  Patient reports he is feeling better today.  He denies suicidal ideation or homicidal ideation.  He continues to endorse some anxiety.  Agitation and irritability are improved.  Patient is experiencing daytime sedation, likely due to Depakote ER.  He is agreeable with consolidating Depakote DR to bedtime, starting tomorrow.  Patient would like to continue lithium taper.  Patient is no longer interested in Invega Sustenna MCGOVERN.  Given patient's improvement in his symptoms on Depakote DR and Zyprexa, will discontinue Invega per patient's request.  Propranolol changed to PRN per patient's request.  Mirtazapine changed to PRN per patient's request.    Plan:   Today's Changes:  -Consolidate Depakote  mg to bedtime, starting tomorrow.  -Decrease lithium to 300 mg at bedtime.  -Change scheduled propranolol to as needed.  -Change scheduled mirtazapine to as needed.  -Order as needed hydrocortisone cream for facial rash.  -Discontinue Invega.    Medications:  Target psychiatric symptoms and interventions:  Target psychiatric symptoms and interventions:  # Psychosis   - Discontinue Invega 6mg daily.   - Continue Zyprexa 20mg HS.     # Mood  - Decrease lithium to 300 mg HS, with plans to discontinue.  - Consolidate Depakote  mg to HS.   - Change mirtazapine 7.5mg HS to PRN.     # Anxiety   - Change propranolol  10 mg TID to PRN.  - Continue gabapentin 100 mg 3 times daily as needed.    # Insomnia  - Mirtazapine as above.     # Agitation  - Continue Haldol 5mg BID PRN.   - Continue Zyprexa 10mg TID PRN for severe agitation.     # Nicotine withdrawal  - Continue Commit 2-4mg every hour PRN.     # Tremor  - Continue propranolol as above.     Risks, benefits, and alternatives discussed at length with patient.     Disposition:    Patient will likely discharge to his father's home, where he currently resides.  He would likely benefit from IOP.  He would likely benefit from therapy focused on development of life skills and coping skills.  Assume patient will return to [facility] for outpatient psychiatric medication management.     Reason for ongoing admission:  patient is unable to care for self due to severe psychosis or priti.      Estimated length of stay:  7-14 days.    Medical:  --Internal medicine to follow up for medical problems     Pertinent labs/imaging:   Latest Reference Range & Units 08/14/24 08:02   Cholesterol <200 mg/dL 143   Folate 4.6 - 34.8 ng/mL 16.4   HDL Cholesterol >=40 mg/dL 25 (L)   LDL Cholesterol Calculated <=100 mg/dL 73   Non HDL Cholesterol <130 mg/dL 118   Triglycerides <150 mg/dL 223 (H)   Vitamin B12 232 - 1,245 pg/mL 865   Vitamin D, Total (25-Hydroxy) 20 - 50 ng/mL 32   (L): Data is abnormally low  (H): Data is abnormally high    Consults:   --Care was coordinated with the treatment team.   --The patient was consulted on nature of illness and treatment options.        ODILIA Mckeon, CNP     This note was created with the help of Dragon dictation system. All grammatical/typing errors or context distortion are unintentional and inherent to software.    Attestation:  Patient has been seen and evaluated by me, ODILIA Mckeon, CNP.  I spent >35 min spent on the date of the encounter in chart review, patient visit, review of tests, documentation, care coordination,  and/or discussion with other providers about the issues documented above.

## 2024-08-16 NOTE — PLAN OF CARE
"  Problem: Psychotic Signs/Symptoms  Goal: Enhanced Social, Occupational or Functional Skills (Psychotic Signs/Symptoms)  Outcome: Progressing   Goal Outcome Evaluation:     Plan of Care Reviewed With: patient       Patient has been cooperative and calm. He was compliant with medications. He did not complain of \"twiitching\" in his hands today. He didn't go to group this shift. He says he still feels tired and is often napping between meals. He endorses some depression and anxiety. Denies SI and hallucinations.. PRN nicotine lozenge given.           "

## 2024-08-16 NOTE — PROGRESS NOTES
NOC Shift Report    Pt in bed at beginning of shift, breathing quiet and unlabored. Pt slept 6.25 hours. Pt is on status 15 min checks.

## 2024-08-16 NOTE — PLAN OF CARE
BEH IP Unit Acuity Rating Score (UARS)  Patient is given one point for every criteria they meet.    CRITERIA SCORING   On a 72 hour hold, court hold, committed, stay of commitment, or revocation. 0    Patient LOS on BEH unit exceeds 20 days. 0  LOS: 3   Patient under guardianship, 55+, otherwise medically complex, or under age 11. 0   Suicide ideation without relief of precipitating factors. 0   Current plan for suicide. 0   Current plan for homicide. 0   Imminent risk or actual attempt to seriously harm another without relief of factors precipitating the attempt. 0   Severe dysfunction in daily living (ex: complete neglect for self care, extreme disruption in vegetative function, extreme deterioration in social interactions). 1   Recent (last 7 days) or current physical aggression in the ED or on unit. 1   Restraints or seclusion episode in past 72 hours. 0   Recent (last 7 days) or current verbal aggression, agitation, yelling, etc., while in the ED or unit. 1   Active psychosis. 1   Need for constant or near constant redirection (from leaving, from others, etc).  0   Intrusive or disruptive behaviors. 0   Patient requires 3 or more hours of individualized nursing care per 8-hour shift (i.e. for ADLs, meds, therapeutic interventions). 1   TOTAL 5

## 2024-08-16 NOTE — PLAN OF CARE
Upcoming Meetings and Appointments:   Team note: Wednesday     Tasks Complete:  Chart review and met with team, discussed pt progress, symptomology, and response to treatment.  Discussed the discharge plan and any potential impediments to discharge    Patient would like a new psych provider. Does not want to return to his previous provider at [facility]     Discharge Plan or Goal   Plan  TBD discharge home with PHP or IOP . Possibly explore residential.     Care Team   Last appointment scheduled in July with  Jose Maria Adhikari DNP, [facility], [address], [address], [place]  [address]; [phone], [phone] (fax)      Barriers to Discharge   Requires ongoing stabilization     Referral Status  TBD     Legal Status  Voluntary     Next Steps:  Schedule OP appointments and make referral when Ángel is closer to baseline . Ángel wants a new psych provider. Does not want to return to previous provider at [facility]

## 2024-08-17 PROCEDURE — 124N000002 HC R&B MH UMMC

## 2024-08-17 PROCEDURE — 250N000013 HC RX MED GY IP 250 OP 250 PS 637: Performed by: REGISTERED NURSE

## 2024-08-17 PROCEDURE — 250N000013 HC RX MED GY IP 250 OP 250 PS 637: Performed by: EMERGENCY MEDICINE

## 2024-08-17 RX ADMIN — METFORMIN HYDROCHLORIDE 500 MG: 500 TABLET, EXTENDED RELEASE ORAL at 17:57

## 2024-08-17 RX ADMIN — NICOTINE POLACRILEX 4 MG: 2 LOZENGE ORAL at 18:53

## 2024-08-17 RX ADMIN — NICOTINE POLACRILEX 2 MG: 2 LOZENGE ORAL at 09:03

## 2024-08-17 RX ADMIN — LITHIUM CARBONATE 300 MG: 300 TABLET, EXTENDED RELEASE ORAL at 20:36

## 2024-08-17 RX ADMIN — DIVALPROEX SODIUM 750 MG: 500 TABLET, DELAYED RELEASE ORAL at 20:36

## 2024-08-17 RX ADMIN — NICOTINE POLACRILEX 2 MG: 2 LOZENGE ORAL at 12:06

## 2024-08-17 RX ADMIN — OLANZAPINE 20 MG: 20 TABLET, ORALLY DISINTEGRATING ORAL at 20:36

## 2024-08-17 RX ADMIN — SENNOSIDES AND DOCUSATE SODIUM 1 TABLET: 50; 8.6 TABLET ORAL at 20:36

## 2024-08-17 RX ADMIN — NICOTINE POLACRILEX 2 MG: 2 LOZENGE ORAL at 13:22

## 2024-08-17 RX ADMIN — NICOTINE POLACRILEX 4 MG: 2 LOZENGE ORAL at 16:39

## 2024-08-17 ASSESSMENT — ACTIVITIES OF DAILY LIVING (ADL)
ADLS_ACUITY_SCORE: 30
ORAL_HYGIENE: INDEPENDENT
DRESS: INDEPENDENT
ADLS_ACUITY_SCORE: 30
HYGIENE/GROOMING: INDEPENDENT
ADLS_ACUITY_SCORE: 30
DRESS: INDEPENDENT
ADLS_ACUITY_SCORE: 30
LAUNDRY: UNABLE TO COMPLETE
HYGIENE/GROOMING: INDEPENDENT
ADLS_ACUITY_SCORE: 30
ORAL_HYGIENE: INDEPENDENT
ADLS_ACUITY_SCORE: 30
ADLS_ACUITY_SCORE: 30

## 2024-08-17 NOTE — PLAN OF CARE
Goal Outcome Evaluation:    Plan of Care Reviewed With: patient        Patient presents with a blunted flat affect, his mood is anxious and depressed, he was pleasant upon assessment. He denies SI/SIB/HI and AVH. Patient requested to play guitar and that went well, he reported that singing and playing of the guitar was helpful with his anxiety. He denies having any pain or discomfort. No PRNs this shift except nicotine Lozenges. He reports his appetite as adequate, denies bowel and bladder issues. He was compliant with his scheduled medications. VS stable, to continue with POC.

## 2024-08-17 NOTE — PLAN OF CARE
"  Problem: Psychotic Signs/Symptoms  Goal: Improved Sleep (Psychotic Signs/Symptoms)  Outcome: Progressing   Goal Outcome Evaluation:     Plan of Care Reviewed With: patient       Patient was surprised by the fact he only had scheduled Senokot this morning, when he was previously on more meds in the morning. He said, \"Not even Propranolol? So I'm just going to shake all day?\" Patient asked what Senokot was for, which he has every day. When writer explained, he said, \"So it's to give me diarrhea?\" Writer explained that it was not, and patient said \"so I'm on it because of the LIthium, which patient had been taking prior to admission. He also said it was writer RN's job to tell him whether he should take it or not. Asked patient several times if he was constipated and he didn't answer. He refused the med.  He sang loudly in his room at times. He was given nicotine lozenges prn. No other prns given this shift. He denied mental health problems. He has a flat affect and was guarded today.         "

## 2024-08-17 NOTE — PLAN OF CARE
Pt was observed resting/sleeping in bed early in the night.  No difficulties reported.  He is awake to use the bathroom then walked into the dining area to get ice water at 4:10 am.  Pt appeared guarded and dismissive of staff when addressed.    He quietly returned to his room without making any requests or offering any complaints.    Approximately 6.5 hours of sleep is recorded.

## 2024-08-17 NOTE — PLAN OF CARE
Problem: Adult Behavioral Health Plan of Care  Goal: Plan of Care Review  Outcome: Progressing  Flowsheets (Taken 8/16/2024 1700)  Patient Agreement with Plan of Care: agrees     Problem: Psychotic Signs/Symptoms  Goal: Improved Behavioral Control (Psychotic Signs/Symptoms)  Outcome: Progressing   Goal Outcome Evaluation:    Plan of Care Reviewed With: patient        Pt endorsed anxiety and rated at 4/10, depression 4/10, but denied SI/SIB/HI/AVH, and contracted for safety. Gabapentin administered with some relief. Pt is isolative and withdrawn to room the majority of the shift, but came out for meals. Pt is pleasant, calm, cooperative, and medication compliant. No medication adverse effects noted or verbalized. Pt is eating and drinking adequately. No safety or behavioral concerns noted. Will continue to monitor and treat as ordered.

## 2024-08-18 PROCEDURE — 250N000013 HC RX MED GY IP 250 OP 250 PS 637: Performed by: REGISTERED NURSE

## 2024-08-18 PROCEDURE — 250N000013 HC RX MED GY IP 250 OP 250 PS 637: Performed by: EMERGENCY MEDICINE

## 2024-08-18 PROCEDURE — 90853 GROUP PSYCHOTHERAPY: CPT | Performed by: COUNSELOR

## 2024-08-18 PROCEDURE — 124N000002 HC R&B MH UMMC

## 2024-08-18 RX ADMIN — NICOTINE POLACRILEX 4 MG: 2 LOZENGE ORAL at 19:59

## 2024-08-18 RX ADMIN — NICOTINE POLACRILEX 2 MG: 2 LOZENGE ORAL at 11:25

## 2024-08-18 RX ADMIN — METFORMIN HYDROCHLORIDE 500 MG: 500 TABLET, EXTENDED RELEASE ORAL at 17:59

## 2024-08-18 RX ADMIN — NICOTINE POLACRILEX 2 MG: 2 LOZENGE ORAL at 13:52

## 2024-08-18 RX ADMIN — NICOTINE POLACRILEX 2 MG: 2 LOZENGE ORAL at 07:06

## 2024-08-18 RX ADMIN — NICOTINE POLACRILEX 2 MG: 2 LOZENGE ORAL at 15:57

## 2024-08-18 RX ADMIN — LITHIUM CARBONATE 300 MG: 300 TABLET, EXTENDED RELEASE ORAL at 19:59

## 2024-08-18 RX ADMIN — OLANZAPINE 20 MG: 20 TABLET, ORALLY DISINTEGRATING ORAL at 19:59

## 2024-08-18 RX ADMIN — NICOTINE POLACRILEX 4 MG: 2 LOZENGE ORAL at 18:10

## 2024-08-18 RX ADMIN — SENNOSIDES AND DOCUSATE SODIUM 1 TABLET: 50; 8.6 TABLET ORAL at 19:59

## 2024-08-18 RX ADMIN — DIVALPROEX SODIUM 750 MG: 500 TABLET, DELAYED RELEASE ORAL at 19:58

## 2024-08-18 ASSESSMENT — ACTIVITIES OF DAILY LIVING (ADL)
DRESS: INDEPENDENT
ADLS_ACUITY_SCORE: 30
ORAL_HYGIENE: INDEPENDENT
ADLS_ACUITY_SCORE: 30
HYGIENE/GROOMING: INDEPENDENT
ADLS_ACUITY_SCORE: 30

## 2024-08-18 NOTE — PLAN OF CARE
"  Problem: Psychotic Signs/Symptoms  Goal: Optimal Cognitive Function (Psychotic Signs/Symptoms)  Outcome: Progressing  Intervention: Support and Promote Cognitive Ability  Recent Flowsheet Documentation  Taken 8/18/2024 1103 by Gala Dobbs RN  Trust Relationship/Rapport: thoughts/feelings acknowledged   Goal Outcome Evaluation:     Plan of Care Reviewed With: patient       Patient slept all morning until shortly before lunch. He requested a nicotine lozenge prn. When asked how he was doing, he replied, \"well, I'm well rested.\" He ate lunch in the dining area. He went back to bed after lunch. He has had no behavioral issues this shift. He denied mental health symptoms. He was somewhat withdrawn from peers. No irritability noted today.  Patient did get argumentative when he asked for the game cupboard to be opened and was told it would have to wait until after group. \"But, I'm not in group.\" Explained that we encourage patients to go to groups. \"So I'm supposed to go to the last 20 minutes.\" He was told he did not have to, but he abruptly said, \"no, I'm going,\" and walked away.         "

## 2024-08-18 NOTE — PLAN OF CARE
Pt appeared to sleep 6.25  hours tonight.  He was awake to use the bathroom but did not approach staff with requests or concerns.  No episodes of agitation observed.    He is again awake at 5:50 am and approached staff to ask if he could sit in the quiet area in the lounge.  Calm and cooperative.   He remains on assault, cheeking and suicide precautions.

## 2024-08-18 NOTE — PROGRESS NOTES
"Problem: Suicide Risk  Goal: Absence of Self-Harm  Outcome: Progressing     Problem: Psychotic Signs/Symptoms  Goal: Improved Behavioral Control (Psychotic Signs/Symptoms)  Outcome: Progressing   Goal Outcome Evaluation:    Pt was in his room napping at the start of the shift. Pt presents with a flat blunted affect. Mood is calm. Pt was withdrawn most of the shift, came out for meals. Pt was not socializing with peers. Was heard singing loudly in his room at times,  and reading affirmation statements to self. Pt denies all MH issues, stating, \"I am ready to go home. Can we find an exit plan to get out of her. I feel good.\" Pt informed to talk to the team tomorrow. Pt requested to use his phone to check his email concerning his job applications. Pt used for few minutes with staff supervision. Pt was med compliant. Requested Valorie Retana severally this shift. No other issues.Will continue to monitor.  "

## 2024-08-19 PROCEDURE — 80165 DIPROPYLACETIC ACID FREE: CPT | Performed by: REGISTERED NURSE

## 2024-08-19 PROCEDURE — 99232 SBSQ HOSP IP/OBS MODERATE 35: CPT | Performed by: REGISTERED NURSE

## 2024-08-19 PROCEDURE — 250N000013 HC RX MED GY IP 250 OP 250 PS 637: Performed by: EMERGENCY MEDICINE

## 2024-08-19 PROCEDURE — 97150 GROUP THERAPEUTIC PROCEDURES: CPT | Mod: GO

## 2024-08-19 PROCEDURE — 36415 COLL VENOUS BLD VENIPUNCTURE: CPT | Performed by: REGISTERED NURSE

## 2024-08-19 PROCEDURE — 250N000013 HC RX MED GY IP 250 OP 250 PS 637: Performed by: REGISTERED NURSE

## 2024-08-19 PROCEDURE — 124N000002 HC R&B MH UMMC

## 2024-08-19 PROCEDURE — 80164 ASSAY DIPROPYLACETIC ACD TOT: CPT | Performed by: REGISTERED NURSE

## 2024-08-19 RX ORDER — AMOXICILLIN 250 MG
1 CAPSULE ORAL 2 TIMES DAILY PRN
Status: DISCONTINUED | OUTPATIENT
Start: 2024-08-19 | End: 2024-08-22 | Stop reason: HOSPADM

## 2024-08-19 RX ADMIN — NICOTINE POLACRILEX 4 MG: 2 LOZENGE ORAL at 14:45

## 2024-08-19 RX ADMIN — OLANZAPINE 20 MG: 20 TABLET, ORALLY DISINTEGRATING ORAL at 20:22

## 2024-08-19 RX ADMIN — NICOTINE POLACRILEX 4 MG: 2 LOZENGE ORAL at 06:54

## 2024-08-19 RX ADMIN — NICOTINE POLACRILEX 4 MG: 2 LOZENGE ORAL at 18:31

## 2024-08-19 RX ADMIN — NICOTINE POLACRILEX 4 MG: 2 LOZENGE ORAL at 12:23

## 2024-08-19 RX ADMIN — PROPRANOLOL HYDROCHLORIDE 10 MG: 10 TABLET ORAL at 09:07

## 2024-08-19 RX ADMIN — DIVALPROEX SODIUM 750 MG: 500 TABLET, DELAYED RELEASE ORAL at 20:22

## 2024-08-19 RX ADMIN — NICOTINE POLACRILEX 4 MG: 2 LOZENGE ORAL at 21:18

## 2024-08-19 RX ADMIN — NICOTINE POLACRILEX 4 MG: 2 LOZENGE ORAL at 10:21

## 2024-08-19 RX ADMIN — METFORMIN HYDROCHLORIDE 500 MG: 500 TABLET, EXTENDED RELEASE ORAL at 18:30

## 2024-08-19 ASSESSMENT — ACTIVITIES OF DAILY LIVING (ADL)
ADLS_ACUITY_SCORE: 30
LAUNDRY: WITH SUPERVISION
ADLS_ACUITY_SCORE: 30
ORAL_HYGIENE: INDEPENDENT
ADLS_ACUITY_SCORE: 30
DRESS: INDEPENDENT
ADLS_ACUITY_SCORE: 30
HYGIENE/GROOMING: INDEPENDENT
ADLS_ACUITY_SCORE: 30

## 2024-08-19 NOTE — PLAN OF CARE
Pt appeared to be sleeping at the start of the night.  Awake at 5:40 am and approached the desk to request nicotine gum.  He was calm and accepted that nicotine would be given at 6:30 am when the coffee was offered. Pt returned at 6:45 am and appropriately requested his nicotine and coffee.  No behavioral concerns reported.

## 2024-08-19 NOTE — PROGRESS NOTES
"[facility], [place]   Psychiatric Progress Note    Hospital Day: 6  Interim History:   From H&P:  This patient is a 38 year old male with a history of  Bipolar 1 disorder, ADHD, insomnia, suicidal ideation, depression, anxiety, and head injuries, who presented to [facility] ED on 8/9/2024 with psychosis and priti in the context of suspected medication nonadherence.     Team meeting report:  The patient's care was discussed with the treatment team during the daily team meeting and staff's chart notes were reviewed.  Patient attended group.  He was hyperverbal at times.  He was overheard singing loudly in his room.  He played the [employer]. No irritability or agitation.  Per RN report, patient was a bit argumentative about accessing the game cupboard during group.  He was accepting of staff redirection.  Per staff documentation, patient slept 6.25 - 6.5 hours nightly over the weekend.      Met with patient.  Today, patient is calm and cooperative on exam.  He is mildly tangential at times, but able to be redirected.  He reports improvement in his mood.  He reports fewer \"ups and downs\" with Depakote.  He would like to discontinue lithium today.  Patient denies hallucinations, paranoia, anxiety or restlessness.  He is eating and sleeping well.  He states he is feeling \"extremely better\" and asks about discharge this week.  Depakote DR consolidated to HS due to excessive daytime sedation.  Level drawn today.  Provider discusses potential discharge this week pending continued improvement in symptoms and need for further Depakote titration.  Will discuss changing DR to ER formulation for increased tolerability and improved daytime coverage of mood symptoms.      Medications:     Current Facility-Administered Medications   Medication Dose Route Frequency Provider Last Rate Last Admin    divalproex sodium delayed-release (DEPAKOTE) DR tablet 750 mg  750 mg Oral At Bedtime Geneva Corley, ODILIA CNP  "  750 mg at 08/18/24 1958    lithium ER (LITHOBID) CR tablet 300 mg  300 mg Oral At Bedtime Geneva Corley APRN CNP   300 mg at 08/18/24 1959    metFORMIN (GLUCOPHAGE XR) 24 hr tablet 500 mg  500 mg Oral Daily with Carlos Estrada MD   500 mg at 08/18/24 1759    OLANZapine zydis (zyPREXA) ODT tab 20 mg  20 mg Oral At Bedtime Geneva Corley APRN CNP   20 mg at 08/18/24 1959    senna-docusate (SENOKOT-S/PERICOLACE) 8.6-50 MG per tablet 1 tablet  1 tablet Oral BID Geneva Corley APRN CNP   1 tablet at 08/18/24 1959     Allergies:     Allergies   Allergen Reactions    No Known Drug Allergy      Labs:     Recent Results (from the past 672 hour(s))   Urine Drug Screen Panel    Collection Time: 08/09/24  2:25 PM   Result Value Ref Range    Amphetamines Urine Screen Negative Screen Negative    Barbituates Urine Screen Negative Screen Negative    Benzodiazepine Urine Screen Negative Screen Negative    Cannabinoids Urine Screen Negative Screen Negative    Cocaine Urine Screen Negative Screen Negative    Fentanyl Qual Urine Screen Negative Screen Negative    Opiates Urine Screen Negative Screen Negative    PCP Urine Screen Negative Screen Negative   CBC with platelets and differential    Collection Time: 08/09/24  3:33 PM   Result Value Ref Range    WBC Count 11.9 (H) 4.0 - 11.0 10e3/uL    RBC Count 4.76 4.40 - 5.90 10e6/uL    Hemoglobin 14.2 13.3 - 17.7 g/dL    Hematocrit 41.3 40.0 - 53.0 %    MCV 87 78 - 100 fL    MCH 29.8 26.5 - 33.0 pg    MCHC 34.4 31.5 - 36.5 g/dL    RDW 12.6 10.0 - 15.0 %    Platelet Count 243 150 - 450 10e3/uL    % Neutrophils 66 %    % Lymphocytes 26 %    % Monocytes 5 %    % Eosinophils 2 %    % Basophils 1 %    % Immature Granulocytes 0 %    NRBCs per 100 WBC 0 <1 /100    Absolute Neutrophils 7.9 1.6 - 8.3 10e3/uL    Absolute Lymphocytes 3.1 0.8 - 5.3 10e3/uL    Absolute Monocytes 0.6 0.0 - 1.3 10e3/uL    Absolute Eosinophils 0.2 0.0 - 0.7 10e3/uL    Absolute Basophils 0.1  0.0 - 0.2 10e3/uL    Absolute Immature Granulocytes 0.0 <=0.4 10e3/uL    Absolute NRBCs 0.0 10e3/uL   Hemoglobin A1c    Collection Time: 08/09/24  3:33 PM   Result Value Ref Range    Hemoglobin A1C 6.4 (H) <5.7 %   Lithium level    Collection Time: 08/09/24  3:39 PM   Result Value Ref Range    Lithium 1.08 0.60 - 1.20 mmol/L   Comprehensive metabolic panel    Collection Time: 08/09/24  4:14 PM   Result Value Ref Range    Sodium 136 135 - 145 mmol/L    Potassium 3.5 3.4 - 5.3 mmol/L    Carbon Dioxide (CO2) 24 22 - 29 mmol/L    Anion Gap 14 7 - 15 mmol/L    Urea Nitrogen 12.3 6.0 - 20.0 mg/dL    Creatinine 0.83 0.67 - 1.17 mg/dL    GFR Estimate >90 >60 mL/min/1.73m2    Calcium 10.5 (H) 8.8 - 10.4 mg/dL    Chloride 98 98 - 107 mmol/L    Glucose 170 (H) 70 - 99 mg/dL    Alkaline Phosphatase 75 40 - 150 U/L    AST 25 0 - 45 U/L    ALT 39 0 - 70 U/L    Protein Total 7.8 6.4 - 8.3 g/dL    Albumin 4.7 3.5 - 5.2 g/dL    Bilirubin Total 0.6 <=1.2 mg/dL   TSH with free T4 reflex    Collection Time: 08/09/24  4:14 PM   Result Value Ref Range    TSH 2.07 0.30 - 4.20 uIU/mL   Asymptomatic COVID-19 Virus (Coronavirus) by PCR Nasopharyngeal    Collection Time: 08/10/24  8:40 AM    Specimen: Nasopharyngeal; Swab   Result Value Ref Range    SARS CoV2 PCR Negative Negative   EKG 12-lead, complete    Collection Time: 08/13/24  5:06 PM   Result Value Ref Range    Systolic Blood Pressure  mmHg    Diastolic Blood Pressure  mmHg    Ventricular Rate 80 BPM    Atrial Rate 80 BPM    PA Interval 156 ms    QRS Duration 90 ms     ms    QTc 431 ms    P Axis 45 degrees    R AXIS 1 degrees    T Axis 21 degrees    Interpretation ECG       Sinus rhythm  Moderate voltage criteria for LVH, may be normal variant  Borderline ECG  No previous ECGs available  Confirmed by MD SHIRLEY, LEXIE ([id number]) on 8/14/2024 11:41:50 PM     Vitamin D Deficiency    Collection Time: 08/14/24  8:02 AM   Result Value Ref Range    Vitamin D, Total (25-Hydroxy) 32 20 - 50  "ng/mL   Vitamin B12    Collection Time: 08/14/24  8:02 AM   Result Value Ref Range    Vitamin B12 865 232 - 1,245 pg/mL   Folate    Collection Time: 08/14/24  8:02 AM   Result Value Ref Range    Folic Acid 16.4 4.6 - 34.8 ng/mL   Lipid panel reflex to direct LDL    Collection Time: 08/14/24  8:02 AM   Result Value Ref Range    Cholesterol 143 <200 mg/dL    Triglycerides 223 (H) <150 mg/dL    Direct Measure HDL 25 (L) >=40 mg/dL    LDL Cholesterol Calculated 73 <=100 mg/dL    Non HDL Cholesterol 118 <130 mg/dL   Extra Purple Top Tube    Collection Time: 08/14/24  8:02 AM   Result Value Ref Range    Hold Specimen JIC        Psychiatric Examination:   Temp: 98.1  F (36.7  C) Temp src: Temporal BP: 118/89 Pulse: 94   Resp: 16 SpO2: 96 % O2 Device: None (Room air)    Weight is 294 lbs 9.6 oz  Body mass index is 43.5 kg/m .    Appearance:  Awake, alert, adequately groomed, dressed in hospital scrubs with shirt from home.  Attitude: Calm, cooperative  Eye Contact:  good  Mood:  \"I'm feeling extremely better\"  Affect:   blunted, more relaxed but still mildly tense  Speech:  clear, coherent, appropriate rate and volume  Psychomotor Behavior:  No psychomotor agitation noted  Thought Process:  concrete, tangential at times   Associations:  no loose associations  Thought Content:  no evidence of suicidal ideation or homicidal ideation and patient appears to be responding to internal stimuli  Insight:  fair  Judgement:  fair  Oriented to:  Not formally tested, grossly intact.  Attention Span and Concentration:  Fair, able to engage in interview.  Recent and Remote Memory:  Not formally tested, adequate for interview.    Precautions:     Behavioral Orders   Procedures    Assault precautions    Cheeking Precautions (behavioral units)    Code 1 - Restrict to Unit    Routine Programming     As clinically indicated    Status 15     Every 15 minutes.    Suicide precautions: Suicide Risk: HIGH     Patients on Suicide Precautions should " "have a Combination Diet ordered that includes a Diet selection(s) AND a Behavioral Tray selection for Safe Tray - with utensils, or Safe Tray - NO utensils       Order Specific Question:   Suicide Risk     Answer:   HIGH     Diagnoses:     Primary Diagnoses:  Bipolar 1 disorder, current episode manic, vs Schizophrenia vs Schizoaffective d/o  Medication nonadherence     Secondary Diagnosis:   Bipolar 1 disorder, per chart  R/o schizophrenia, per chart  ADHD, per chart  Vitamin D deficiency, per chart  Anxiety, per chart  Insomnia, per chart  Cannabis use, per chart    Clinically Significant Risk Factors Present on Admission        # Severe Obesity: Estimated body mass index is 43.5 kg/m  as calculated from the following:    Height as of this encounter: 1.753 m (5' 9\").    Weight as of this encounter: 133.6 kg (294 lb 9.6 oz).          Hospital Course:     Plan on Admission:  Admit to [address]   Legal Status: Voluntary   Continue PTA Zyprexa 20mg HS, Lithium 1200mg HS, Metformin 500mg PM, PRN propranolol 10mg TID, and PRN Haldol 5mg BID.  PTA Ambien was not initiated on admission.  Start Invega 3mg AM, mirtazapine 7.5mg HS, PRN hydroxyzine, PRN Zyprexa, PRN trazodone, PRN NRT.    Start Safety precautions:  suicide, cheeking, elopement, assault.  Order additional Labs: TSH, lipids, HgbA1c, B12/folate, vitamin D.  Order EKG   Start SIO 1:1 for verbal aggression/assault risk and intrusiveness.     8/14:  Discontinue SIO.  Increase Invega to 6 mg.  Patient would like to discontinue lithium.  Will cross-titrate Depakote and lithium.  Decrease lithium to 975 mg at bedtime and initiate Depakote  mg twice daily.  Schedule as needed propranolol and senna.  Will discontinue as needed hydroxyzine due to patient reports of no benefit.  Will initiate as needed gabapentin for anxiety.  8/15: Patient reports improvement in agitation and irritability with initiation of Depakote yesterday.  He would like to continue " cross titration of Depakote and lithium.  Will increase Depakote to 500 mg at bedtime.  Will decrease lithium to 600 mg.  8/16:  Patient reports he is feeling better today.  He denies suicidal ideation or homicidal ideation.  He continues to endorse some anxiety.  Agitation and irritability are improved.  Patient is experiencing daytime sedation, likely due to Depakote DR.  He is agreeable with consolidating Depakote DR to bedtime, starting tomorrow.  Patient would like to continue lithium taper.  Patient is no longer interested in Invega Sustenna MCGOVERN.  Given patient's improvement in his symptoms on Depakote DR and Zyprexa, will discontinue Invega per patient's request.  Propranolol changed to PRN per patient's request.  Mirtazapine changed to PRN per patient's request.  8/17:  Patient continues to improve.  His affect  is blunted and tense.  No agitation or irritability.  Thought process is concrete and a bit tangential.  Reports subjective improvement with Depakote in place of lithium.      Plan:   Today's Changes:  - Depakote level today.  - Will discuss changing Depakote  mg to ER. May shane to titrate pending Depakote level.  - Change Senna to PRN per patient request.    Target psychiatric symptoms and interventions:    # Psychosis   - Continue Zyprexa 20mg HS.     # Mood  - Discontinue lithium.  - Continue Depakote  mg HS.   - Continue mirtazapine 7.5mg HS PRN.     # Anxiety   - Continue propranolol 10 mg TID PRN.  - Continue gabapentin 100 mg 3 times daily as needed.    # Insomnia  - Mirtazapine as above.     # Agitation  - Continue Haldol 5mg BID PRN.   - Continue Zyprexa 10mg TID PRN for severe agitation.     # Nicotine withdrawal  - Continue Commit 2-4mg every hour PRN.     # Tremor  - Continue propranolol as above.     Risks, benefits, and alternatives discussed at length with patient.     Disposition:    Patient will likely discharge to his father's home, where he currently resides.  He would  likely benefit from IOP.  He would likely benefit from therapy focused on development of life skills and coping skills.  Assume patient will return to [facility] for outpatient psychiatric medication management.     Reason for ongoing admission:  patient is unable to care for self due to severe psychosis or priti.      Estimated length of stay:  7-14 days.    Medical:  --Internal medicine to follow up for medical problems     Pertinent labs/imaging:  --8/14:  HDL 25,     Consults:   --Care was coordinated with the treatment team.   --The patient was consulted on nature of illness and treatment options.        ODILIA Mckeon, CNP     This note was created with the help of Dragon dictation system. All grammatical/typing errors or context distortion are unintentional and inherent to software.    Attestation:  Patient has been seen and evaluated by me, ODILIA Mckeon, CNP.  I spent >35 min spent on the date of the encounter in chart review, patient visit, review of tests, documentation, care coordination, and/or discussion with other providers about the issues documented above.

## 2024-08-19 NOTE — PLAN OF CARE
BEH IP Unit Acuity Rating Score (UARS)  Patient is given one point for every criteria they meet.    CRITERIA SCORING   On a 72 hour hold, court hold, committed, stay of commitment, or revocation. 0    Patient LOS on BEH unit exceeds 20 days. 0  LOS: 6   Patient under guardianship, 55+, otherwise medically complex, or under age 11. 0   Suicide ideation without relief of precipitating factors. 0   Current plan for suicide. 0   Current plan for homicide. 0   Imminent risk or actual attempt to seriously harm another without relief of factors precipitating the attempt. 0   Severe dysfunction in daily living (ex: complete neglect for self care, extreme disruption in vegetative function, extreme deterioration in social interactions). 1   Recent (last 7 days) or current physical aggression in the ED or on unit. 1   Restraints or seclusion episode in past 72 hours. 0   Recent (last 7 days) or current verbal aggression, agitation, yelling, etc., while in the ED or unit. 1   Active psychosis. 1   Need for constant or near constant redirection (from leaving, from others, etc).  0   Intrusive or disruptive behaviors. 0   Patient requires 3 or more hours of individualized nursing care per 8-hour shift (i.e. for ADLs, meds, therapeutic interventions). 1   TOTAL 5

## 2024-08-19 NOTE — PLAN OF CARE
Goal Outcome Evaluation:      Group Attendance:  attended full group    Time session began: 8:10 pm  Time session ended: 9:00 pm  Patient's total time in group: 50    Total # Attendees   4   Group Type psychotherapeutic     Group Topic Covered symptom management     Group Session Detail Mindful Painting/ Process coping with anxiety     Patient's response to the group topic/interactions:  positive affect, cooperative with task, actively engaged, used humor appropriately, verbalizations were off topic, and interrupted others/ frequently     Patient Details: Pt is improving but still hyper verbal, off topic at times. He said he had a good visit with dad and he would allow him to come home when meds were working properly. He was actively engaged. Enjoyed watercolors for the first time in a long time he said. He participated in the conversation about anxiety and used the example of being anxious his glasses will break when he plays basketball and not being able to afford contacts. He also said he would like to find a good job and have a pet.            [id number] - Group psychotherapy - 1 Session  Patient Active Problem List   Diagnosis    Bipolar 1 disorder, depressed (H)    Bipolar 1 disorder, mixed, moderate (H)    Bipolar I disorder, most recent episode mixed, severe with psychotic features (H)    Class 3 severe obesity due to excess calories without serious comorbidity with body mass index (BMI) of 40.0 to 44.9 in adult (H)    Flank pain    Former smoker    GERD (gastroesophageal reflux disease)    History of cannabis abuse    History of diabetes insipidus    Insomnia due to other mental disorder    Mixed hyperlipidemia    Severe recurrent major depression with psychotic features, mood-congruent (H)    Suicidal ideation    Vitamin D insufficiency    Bipolar 1 disorder (H)    Bipolar affective disorder, currently manic, moderate (H)

## 2024-08-19 NOTE — PLAN OF CARE
"Goal Outcome Evaluation:    Problem: Suicide Risk  Goal: Absence of Self-Harm  Outcome: Progressing     Pt presents calm, cooperative, and pleasant. Pt denies SI/HI/SIB, hallucinations, and depression but endorses anxiety because he \"shit 3 times already today\". Pt appears to have difficulty with concentrating when speaking with writer, and would become distracted with certain words that writer would say. Writer would be mid-sentence, and Pt would hear a word and cut writer off and start rapping about the word he just heard. Pt was also heard singing loudly in his room at times. Pt split time between room and milieu. Pt attended Group Therapy today.     Pt reports having 3 bowel movements in the morning and declined need for Senna (which has now been scheduled PRN). Pt denies pain. Pt requests PRN Nicotine and propanolol for anxiety.  VSS (initially declined, however accepted to receive Propanolol), medication compliant, behaviorally in control throughout shift.       "

## 2024-08-19 NOTE — CARE PLAN
"  Rehab Group    Start time: 1015  End time: 1200  Patient time total: 70 minutes    attended full group    #8 attended   Group Type: occupational therapy   Group Topic Covered: balanced lifestyle, cognitive activities, coping skills, healthy leisure time, problem solving, self-esteem, and social skills       Group Session Detail:   Hands on creative expression group with [employer] for concentration, attention to detail, relaxation through repetition, follow through, frustration tolerance, coping, mood stabilization, reality-based activity, improving self-esteem, and socialization.        Patient Response/Contribution:  actively engaged       Patient Detail:  Pt was overall pleasant and engaged in group.  He worked on a yarn bowl and finished it in a fairly quick amount of time.  Pt had many obvious and large areas of error in his work, though he was unconcerned about this.  Therapist offered to help him to correct his mistakes and pt asserted that he thought the bowl was fine as is, minimizing his errors.  Pt was unable to problem solve how to tie off his bowl at the end of the group, though he did allow therapist to assist him with this.  When pt left group he took the bowl to his room.  He returned to the group room a few minutes later and was asked to return the bowl as it needed to be locked up.  Pt was subtly sarcastic, stating, \"Oh, I'm sorry, I took something that belongs to me to my room.\"  Therapist gently explained that it has a very long piece of yarn and could be dangerous, therefore it is not allowed to be out on the unit.  Pt did agree to return it and calmed when therapist reassured him it would be placed in his locker with his other belongings.          [id number] OT Group (2 or more in attendance)      Patient Active Problem List   Diagnosis    Bipolar 1 disorder, depressed (H)    Bipolar 1 disorder, mixed, moderate (H)    Bipolar I disorder, most recent episode mixed, severe with psychotic features " (H)    Class 3 severe obesity due to excess calories without serious comorbidity with body mass index (BMI) of 40.0 to 44.9 in adult (H)    Flank pain    Former smoker    GERD (gastroesophageal reflux disease)    History of cannabis abuse    History of diabetes insipidus    Insomnia due to other mental disorder    Mixed hyperlipidemia    Severe recurrent major depression with psychotic features, mood-congruent (H)    Suicidal ideation    Vitamin D insufficiency    Bipolar 1 disorder (H)    Bipolar affective disorder, currently manic, moderate (H)

## 2024-08-19 NOTE — PLAN OF CARE
Upcoming Meetings and Dates/Important Information  Days since IP admission 6  Team Note Due Wednesday  No future appointments.     Next Steps  Establish care with new psych provider    Assessment/Intervention/Current Symptoms and Care Coordination  Chart Review  Met with team to discuss patient care.  Spoke with pt about discharge needs. Ambivalent about therapy. Wants a new provider, OK with telehealth in the afternoon.     Discharge Plan or Goal  Dispo Plan: TBD  Transportation: TBD  Medication: TBD    Provisional discharge: Not needed  Safety plan complete?: Not yet completed  Appointments:   TBD     Barriers to Discharge  patient requires further psychiatric stabilization due to current symptomology, medication management with possible adjustments    Expected Discharge Date: 08/22/2024        Discharge Comments: Requries ongoing stabilization          Referral Status  No new referral made    Legal Status  voluntary    Contacts  Fabián Bardales Brother [phone]

## 2024-08-19 NOTE — CARE PLAN
"  Rehab Group    Start time: 1315  End time: 1400  Patient time total: 45 minutes    attended full group    #4 attended   Group Type: occupational therapy   Group Topic Covered: balanced lifestyle, coping skills, healthy leisure time, and social skills       Group Session Detail:   Activity to encourage socialization, Rajani Ante for concentration, tracking, follow through, expanding social skills, coping, mood stabilization, reality-based activity, and an opportunity to experience success.       Patient Response/Contribution:  actively engaged       Patient Detail:  Pt was pleasant and engaged in group.  At times pt was a bit tangential with his responses and out often hint about \"things I shouldn't say\" seemingly to build intrigue.  Pt appeared to be content and seemed to enjoy sharing many aspects of his life with the group.  Pt's social skills were a bit awkward at times and he doesn't always seem to recognize social cues.          [id number] OT Group (2 or more in attendance)      Patient Active Problem List   Diagnosis    Bipolar 1 disorder, depressed (H)    Bipolar 1 disorder, mixed, moderate (H)    Bipolar I disorder, most recent episode mixed, severe with psychotic features (H)    Class 3 severe obesity due to excess calories without serious comorbidity with body mass index (BMI) of 40.0 to 44.9 in adult (H)    Flank pain    Former smoker    GERD (gastroesophageal reflux disease)    History of cannabis abuse    History of diabetes insipidus    Insomnia due to other mental disorder    Mixed hyperlipidemia    Severe recurrent major depression with psychotic features, mood-congruent (H)    Suicidal ideation    Vitamin D insufficiency    Bipolar 1 disorder (H)    Bipolar affective disorder, currently manic, moderate (H)       "

## 2024-08-20 LAB
ALBUMIN SERPL BCG-MCNC: 3.7 G/DL (ref 3.5–5.2)
ALP SERPL-CCNC: 67 U/L (ref 40–150)
ALT SERPL W P-5'-P-CCNC: 62 U/L (ref 0–70)
AST SERPL W P-5'-P-CCNC: 25 U/L (ref 0–45)
BILIRUB DIRECT SERPL-MCNC: <0.2 MG/DL (ref 0–0.3)
BILIRUB SERPL-MCNC: 0.2 MG/DL
PROT SERPL-MCNC: 6.2 G/DL (ref 6.4–8.3)

## 2024-08-20 PROCEDURE — 99232 SBSQ HOSP IP/OBS MODERATE 35: CPT | Performed by: REGISTERED NURSE

## 2024-08-20 PROCEDURE — 250N000013 HC RX MED GY IP 250 OP 250 PS 637: Performed by: REGISTERED NURSE

## 2024-08-20 PROCEDURE — 36415 COLL VENOUS BLD VENIPUNCTURE: CPT | Performed by: REGISTERED NURSE

## 2024-08-20 PROCEDURE — 97150 GROUP THERAPEUTIC PROCEDURES: CPT | Mod: GO

## 2024-08-20 PROCEDURE — 90853 GROUP PSYCHOTHERAPY: CPT

## 2024-08-20 PROCEDURE — 124N000002 HC R&B MH UMMC

## 2024-08-20 PROCEDURE — 90853 GROUP PSYCHOTHERAPY: CPT | Performed by: COUNSELOR

## 2024-08-20 PROCEDURE — 80076 HEPATIC FUNCTION PANEL: CPT | Performed by: REGISTERED NURSE

## 2024-08-20 PROCEDURE — 250N000013 HC RX MED GY IP 250 OP 250 PS 637: Performed by: EMERGENCY MEDICINE

## 2024-08-20 RX ADMIN — NICOTINE POLACRILEX 4 MG: 2 LOZENGE ORAL at 17:18

## 2024-08-20 RX ADMIN — DIVALPROEX SODIUM 750 MG: 500 TABLET, DELAYED RELEASE ORAL at 20:09

## 2024-08-20 RX ADMIN — OLANZAPINE 20 MG: 20 TABLET, ORALLY DISINTEGRATING ORAL at 20:09

## 2024-08-20 RX ADMIN — NICOTINE POLACRILEX 4 MG: 2 LOZENGE ORAL at 11:41

## 2024-08-20 RX ADMIN — METFORMIN HYDROCHLORIDE 500 MG: 500 TABLET, EXTENDED RELEASE ORAL at 18:08

## 2024-08-20 RX ADMIN — NICOTINE POLACRILEX 4 MG: 2 LOZENGE ORAL at 14:09

## 2024-08-20 RX ADMIN — NICOTINE POLACRILEX 4 MG: 2 LOZENGE ORAL at 19:14

## 2024-08-20 ASSESSMENT — ACTIVITIES OF DAILY LIVING (ADL)
ADLS_ACUITY_SCORE: 30
DRESS: INDEPENDENT
ADLS_ACUITY_SCORE: 30
ORAL_HYGIENE: INDEPENDENT
ADLS_ACUITY_SCORE: 30
HYGIENE/GROOMING: INDEPENDENT
ADLS_ACUITY_SCORE: 30
LAUNDRY: WITH SUPERVISION

## 2024-08-20 NOTE — PLAN OF CARE
"Problem: Psychotic Signs/Symptoms  Goal: Optimal Cognitive Function (Psychotic Signs/Symptoms)  Intervention: Support and Promote Cognitive Ability    Goal Outcome Evaluation:    Plan of Care Reviewed With: patient      Pt said that the Depakote medication has been \"life-changing\" for him. Indicated he has been getting plenty of sleep. Said he had a nice conversation with his father and brother today. Affect is pleasant, blunted. Watched TV, minimal interactions with peers. Took shower and received fresh scrub pants. Pt was noted to be singing very loudly while in shower, heard down at other end of groves. Took bedtime medications and was cooperative with cheeking precautions. PRN nicotine provided x3.    /86 (BP Location: Left arm, Patient Position: Sitting, Cuff Size: Adult Large)   Pulse 84   Temp 97.2  F (36.2  C) (Temporal)   Resp 16   Ht 1.753 m (5' 9\")   Wt 133.6 kg (294 lb 9.6 oz)   SpO2 99%   BMI 43.50 kg/m      "

## 2024-08-20 NOTE — PROGRESS NOTES
[facility], [place]   Psychiatric Progress Note    Hospital Day: 7  Interim History:   From H&P:  This patient is a 38 year old male with a history of  Bipolar 1 disorder, ADHD, insomnia, suicidal ideation, depression, anxiety, and head injuries, who presented to [facility] ED on 8/9/2024 with psychosis and priti in the context of suspected medication nonadherence.     Team meeting report:  The patient's care was discussed with the treatment team during the daily team meeting and staff's chart notes were reviewed.  Patient attended groups.   He was tangential and made a sarcastic comment to therapist.  However, he was content with his workmanship.  He was observed to have awkward social skills.  Patient told nursing staff that Depakote was life-changing.  He spoke to his brother and father.  He was noted to sing very loudly in the shower.  He reported having 3 bowel movements yesterday morning.  He appeared to have difficulty concentrated and was easily distracted.  Per staff documentation, patient slept 7 hours last night.  He took PRN propranolol x 1  and nicotine gum x 6.      Met with patient.  Today, patient reports he is feeling better.  He states his sleep and appetite are adequate.  Patient reports he slept greater than 7 hours last night.  He denies nighttime awakenings except to use the bathroom.  Patient reports having 1 bowel movement this morning.  Hepatic panel labs are within normal limits.  Patient denies anxiety, restlessness, or depression.  No side effects from medications, including nausea or vomiting.  Provider discusses changing DR formulation to ER for increased tolerability.  Patient states he would like to continue DR formulation at night.  Provider discusses increasing metformin to 500 mg twice daily due to prediabetes and prevention of antipsychotic associated weight gain.  Patient declines an increase at this time, stating he would likely not remember to take an  additional dose.  Patient is doing better and would like to discharge home this week.  Provider discusses pending Depakote level to determine whether further titration is needed.    Medications:     Current Facility-Administered Medications   Medication Dose Route Frequency Provider Last Rate Last Admin    divalproex sodium delayed-release (DEPAKOTE) DR tablet 750 mg  750 mg Oral At Bedtime Geneva Corley APRN CNP   750 mg at 08/19/24 2022    metFORMIN (GLUCOPHAGE XR) 24 hr tablet 500 mg  500 mg Oral Daily with supper Carlos Urbina MD   500 mg at 08/19/24 1830    OLANZapine zydis (zyPREXA) ODT tab 20 mg  20 mg Oral At Bedtime Geneva Corley APRN CNP   20 mg at 08/19/24 2022     Allergies:     Allergies   Allergen Reactions    No Known Drug Allergy      Labs:     Recent Results (from the past 672 hour(s))   Urine Drug Screen Panel    Collection Time: 08/09/24  2:25 PM   Result Value Ref Range    Amphetamines Urine Screen Negative Screen Negative    Barbituates Urine Screen Negative Screen Negative    Benzodiazepine Urine Screen Negative Screen Negative    Cannabinoids Urine Screen Negative Screen Negative    Cocaine Urine Screen Negative Screen Negative    Fentanyl Qual Urine Screen Negative Screen Negative    Opiates Urine Screen Negative Screen Negative    PCP Urine Screen Negative Screen Negative   CBC with platelets and differential    Collection Time: 08/09/24  3:33 PM   Result Value Ref Range    WBC Count 11.9 (H) 4.0 - 11.0 10e3/uL    RBC Count 4.76 4.40 - 5.90 10e6/uL    Hemoglobin 14.2 13.3 - 17.7 g/dL    Hematocrit 41.3 40.0 - 53.0 %    MCV 87 78 - 100 fL    MCH 29.8 26.5 - 33.0 pg    MCHC 34.4 31.5 - 36.5 g/dL    RDW 12.6 10.0 - 15.0 %    Platelet Count 243 150 - 450 10e3/uL    % Neutrophils 66 %    % Lymphocytes 26 %    % Monocytes 5 %    % Eosinophils 2 %    % Basophils 1 %    % Immature Granulocytes 0 %    NRBCs per 100 WBC 0 <1 /100    Absolute Neutrophils 7.9 1.6 - 8.3 10e3/uL     Absolute Lymphocytes 3.1 0.8 - 5.3 10e3/uL    Absolute Monocytes 0.6 0.0 - 1.3 10e3/uL    Absolute Eosinophils 0.2 0.0 - 0.7 10e3/uL    Absolute Basophils 0.1 0.0 - 0.2 10e3/uL    Absolute Immature Granulocytes 0.0 <=0.4 10e3/uL    Absolute NRBCs 0.0 10e3/uL   Hemoglobin A1c    Collection Time: 08/09/24  3:33 PM   Result Value Ref Range    Hemoglobin A1C 6.4 (H) <5.7 %   Lithium level    Collection Time: 08/09/24  3:39 PM   Result Value Ref Range    Lithium 1.08 0.60 - 1.20 mmol/L   Comprehensive metabolic panel    Collection Time: 08/09/24  4:14 PM   Result Value Ref Range    Sodium 136 135 - 145 mmol/L    Potassium 3.5 3.4 - 5.3 mmol/L    Carbon Dioxide (CO2) 24 22 - 29 mmol/L    Anion Gap 14 7 - 15 mmol/L    Urea Nitrogen 12.3 6.0 - 20.0 mg/dL    Creatinine 0.83 0.67 - 1.17 mg/dL    GFR Estimate >90 >60 mL/min/1.73m2    Calcium 10.5 (H) 8.8 - 10.4 mg/dL    Chloride 98 98 - 107 mmol/L    Glucose 170 (H) 70 - 99 mg/dL    Alkaline Phosphatase 75 40 - 150 U/L    AST 25 0 - 45 U/L    ALT 39 0 - 70 U/L    Protein Total 7.8 6.4 - 8.3 g/dL    Albumin 4.7 3.5 - 5.2 g/dL    Bilirubin Total 0.6 <=1.2 mg/dL   TSH with free T4 reflex    Collection Time: 08/09/24  4:14 PM   Result Value Ref Range    TSH 2.07 0.30 - 4.20 uIU/mL   Asymptomatic COVID-19 Virus (Coronavirus) by PCR Nasopharyngeal    Collection Time: 08/10/24  8:40 AM    Specimen: Nasopharyngeal; Swab   Result Value Ref Range    SARS CoV2 PCR Negative Negative   EKG 12-lead, complete    Collection Time: 08/13/24  5:06 PM   Result Value Ref Range    Systolic Blood Pressure  mmHg    Diastolic Blood Pressure  mmHg    Ventricular Rate 80 BPM    Atrial Rate 80 BPM    IL Interval 156 ms    QRS Duration 90 ms     ms    QTc 431 ms    P Axis 45 degrees    R AXIS 1 degrees    T Axis 21 degrees    Interpretation ECG       Sinus rhythm  Moderate voltage criteria for LVH, may be normal variant  Borderline ECG  No previous ECGs available  Confirmed by MD SHIRLEY, LEXIE  "([id number]) on 8/14/2024 11:41:50 PM     Vitamin D Deficiency    Collection Time: 08/14/24  8:02 AM   Result Value Ref Range    Vitamin D, Total (25-Hydroxy) 32 20 - 50 ng/mL   Vitamin B12    Collection Time: 08/14/24  8:02 AM   Result Value Ref Range    Vitamin B12 865 232 - 1,245 pg/mL   Folate    Collection Time: 08/14/24  8:02 AM   Result Value Ref Range    Folic Acid 16.4 4.6 - 34.8 ng/mL   Lipid panel reflex to direct LDL    Collection Time: 08/14/24  8:02 AM   Result Value Ref Range    Cholesterol 143 <200 mg/dL    Triglycerides 223 (H) <150 mg/dL    Direct Measure HDL 25 (L) >=40 mg/dL    LDL Cholesterol Calculated 73 <=100 mg/dL    Non HDL Cholesterol 118 <130 mg/dL   Extra Purple Top Tube    Collection Time: 08/14/24  8:02 AM   Result Value Ref Range    Hold Specimen JIC    Hepatic panel    Collection Time: 08/20/24  7:58 AM   Result Value Ref Range    Protein Total 6.2 (L) 6.4 - 8.3 g/dL    Albumin 3.7 3.5 - 5.2 g/dL    Bilirubin Total 0.2 <=1.2 mg/dL    Alkaline Phosphatase 67 40 - 150 U/L    AST 25 0 - 45 U/L    ALT 62 0 - 70 U/L    Bilirubin Direct <0.20 0.00 - 0.30 mg/dL       Psychiatric Examination:   Temp: 97.2  F (36.2  C) Temp src: Temporal BP: 117/86 Pulse: 84   Resp: 16 SpO2: 99 % O2 Device: None (Room air)    Weight is 294 lbs 9.6 oz  Body mass index is 43.5 kg/m .    Appearance:  Awake, alert, adequately groomed, dressed in hospital scrubs with shirt from home.  Attitude: Calm, cooperative  Eye Contact:  good  Mood:  \"better\"  Affect:   blunted, more relaxed but still tense  Speech:  clear, coherent, appropriate rate and volume  Psychomotor Behavior:  no psychomotor agitation noted  Thought Process:  concrete   Associations:  no loose associations  Thought Content:  no evidence of suicidal ideation or homicidal ideation and patient appears to be responding to internal stimuli  Insight:  fair  Judgement:  fair  Oriented to:  Not formally tested, grossly intact.  Attention Span and Concentration:  " "Fair, able to engage in interview.  Recent and Remote Memory:  Not formally tested, adequate for interview.    Precautions:     Behavioral Orders   Procedures    Assault precautions    Cheeking Precautions (behavioral units)    Code 1 - Restrict to Unit    Routine Programming     As clinically indicated    Status 15     Every 15 minutes.    Suicide precautions: Suicide Risk: HIGH     Patients on Suicide Precautions should have a Combination Diet ordered that includes a Diet selection(s) AND a Behavioral Tray selection for Safe Tray - with utensils, or Safe Tray - NO utensils       Order Specific Question:   Suicide Risk     Answer:   HIGH     Diagnoses:     Primary Diagnoses:  Bipolar 1 disorder, current episode manic, vs Schizophrenia vs Schizoaffective d/o  Medication nonadherence     Secondary Diagnosis:   Bipolar 1 disorder, per chart  R/o schizophrenia, per chart  ADHD, per chart  Vitamin D deficiency, per chart  Anxiety, per chart  Insomnia, per chart  Cannabis use, per chart    Clinically Significant Risk Factors Present on Admission        # Severe Obesity: Estimated body mass index is 43.5 kg/m  as calculated from the following:    Height as of this encounter: 1.753 m (5' 9\").    Weight as of this encounter: 133.6 kg (294 lb 9.6 oz).          Hospital Course:     Plan on Admission:  Admit to [address]   Legal Status: Voluntary   Continue PTA Zyprexa 20mg HS, Lithium 1200mg HS, Metformin 500mg PM, PRN propranolol 10mg TID, and PRN Haldol 5mg BID.  PTA Ambien was not initiated on admission.  Start Invega 3mg AM, mirtazapine 7.5mg HS, PRN hydroxyzine, PRN Zyprexa, PRN trazodone, PRN NRT.    Start Safety precautions:  suicide, cheeking, elopement, assault.  Order additional Labs: TSH, lipids, HgbA1c, B12/folate, vitamin D.  Order EKG   Start SIO 1:1 for verbal aggression/assault risk and intrusiveness.     8/14:  Discontinue SIO.  Increase Invega to 6 mg.  Patient would like to discontinue lithium.  Will " cross-titrate Depakote and lithium.  Decrease lithium to 975 mg at bedtime and initiate Depakote  mg twice daily.  Schedule as needed propranolol and senna.  Will discontinue as needed hydroxyzine due to patient reports of no benefit.  Will initiate as needed gabapentin for anxiety.  8/15: Patient reports improvement in agitation and irritability with initiation of Depakote yesterday.  He would like to continue cross titration of Depakote and lithium.  Will increase Depakote to 500 mg at bedtime.  Will decrease lithium to 600 mg.  8/16:  Patient reports he is feeling better today.  He denies suicidal ideation or homicidal ideation.  He continues to endorse some anxiety.  Agitation and irritability are improved.  Patient is experiencing daytime sedation, likely due to Depakote DR.  He is agreeable with consolidating Depakote DR to bedtime, starting tomorrow.  Patient would like to continue lithium taper.  Patient is no longer interested in Invega Sustenna MCGVOERN.  Given patient's improvement in his symptoms on Depakote DR and Zyprexa, will discontinue Invega per patient's request.  Propranolol changed to PRN per patient's request.  Mirtazapine changed to PRN per patient's request.  8/19:  Patient continues to improve.  His affect  is blunted and tense.  No agitation or irritability.  Thought process is concrete and a bit tangential.  Reports subjective improvement with Depakote in place of lithium.    8/20:  No medication changes today.  Patient would like to continue with DR formulation of Depakote.  He declined an increase in metformin to 500 mg twice daily.  Awaiting Depakote level to determine whether further titration is warranted.  Hepatic level today was within normal limits.    Plan:   Today's Changes:  - No medication changes today.  Patient appears to be stabilizing on current medication regimen.    Target psychiatric symptoms and interventions:    # Psychosis   - Continue Zyprexa 20mg HS.     # Mood  -  Continue Depakote  mg HS.   - Continue mirtazapine 7.5mg HS PRN.     # Anxiety   - Continue propranolol 10 mg TID PRN.  - Continue gabapentin 100 mg 3 times daily as needed.    # Insomnia  - Mirtazapine as above.     # Agitation  - Continue Haldol 5mg BID PRN.   - Continue Zyprexa 10mg TID PRN for severe agitation.     # Nicotine withdrawal  - Continue Commit 2-4mg every hour PRN.     # Tremor  - Continue propranolol as above.    # Pre-diabetes   # Antipsychotic associated weight gain  - HgbA1c 6.4   - Continue metformin 500 mg with supper.     Risks, benefits, and alternatives discussed at length with patient.     Disposition:    Patient will likely discharge to his father's home, where he currently resides.  He would likely benefit from IOP.  He would likely benefit from therapy focused on development of life skills and coping skills.  Assume patient will return to [facility] for outpatient psychiatric medication management.     Reason for ongoing admission:  patient is unable to care for self due to severe psychosis or priti.      Estimated length of stay:  7-14 days.    Medical:  --Internal medicine to follow up for medical problems     Pertinent labs/imaging:  --8/14:  HDL 25,     Consults:   --Care was coordinated with the treatment team.   --The patient was consulted on nature of illness and treatment options.        ODILIA Mckeon, CNP     This note was created with the help of Dragon dictation system. All grammatical/typing errors or context distortion are unintentional and inherent to software.    Attestation:  Patient has been seen and evaluated by me, ODILIA Mckeon, CNP.  I spent >35 min spent on the date of the encounter in chart review, patient visit, review of tests, documentation, care coordination, and/or discussion with other providers about the issues documented above.

## 2024-08-20 NOTE — PLAN OF CARE
Upcoming Meetings and Dates/Important Information  Days since IP admission 7  Team Note Due Wednesday  No future appointments.     Next Steps  Establish care with new psych provider    Assessment/Intervention/Current Symptoms and Care Coordination  Chart Review  Met with team to discuss patient care.  [facility]  [facility]ed [facility] , pt needs to complete phone screening. Gave him the number and instructed him to call for screening. He said he called and left a voicemail.  Completed referral for pt to [facility].    Discharge Plan or Goal  Dispo Plan: TBD  Transportation: TBD  Medication: TBD    Provisional discharge: Not needed  Safety plan complete?: Not yet completed  Appointments:   TBD     Barriers to Discharge  patient requires further psychiatric stabilization due to current symptomology, medication management with possible adjustments    Expected Discharge Date: 08/23/2024        Discharge Comments: Requries ongoing stabilization          Referral Status  No new referral made    Legal Status  voluntary    Contacts  Fabián Bardales Brother [phone]

## 2024-08-20 NOTE — PLAN OF CARE
Goal Outcome Evaluation:      Group Attendance:  attended full group    Time session began: 2:20 pm  Time session ended: 3:00 pm  Patient's total time in group: 40    Total # Attendees   4   Group Type psychotherapeutic     Group Topic Covered insight improvement     Group Session Detail Self esteem Process     Patient's response to the group topic/interactions:  cooperative with task, organized, supportive of peers, listened actively, attentive, and actively engaged     Patient Details: Mood words happy, enthusiastic and loved. He did a nice job helping and encouraging peers with the project as well. He completed writing safety plan questions / answers, for writer to enter in epic tomorrow for end of week discharge.           [id number] - Group psychotherapy - 1 Session  Patient Active Problem List   Diagnosis    Bipolar 1 disorder, depressed (H)    Bipolar 1 disorder, mixed, moderate (H)    Bipolar I disorder, most recent episode mixed, severe with psychotic features (H)    Class 3 severe obesity due to excess calories without serious comorbidity with body mass index (BMI) of 40.0 to 44.9 in adult (H)    Flank pain    Former smoker    GERD (gastroesophageal reflux disease)    History of cannabis abuse    History of diabetes insipidus    Insomnia due to other mental disorder    Mixed hyperlipidemia    Severe recurrent major depression with psychotic features, mood-congruent (H)    Suicidal ideation    Vitamin D insufficiency    Bipolar 1 disorder (H)    Bipolar affective disorder, currently manic, moderate (H)

## 2024-08-20 NOTE — DISCHARGE INSTRUCTIONS
Behavioral Discharge Planning and Instructions    Summary: You were admitted on 8/9/2024 due to manic symptomology.  You were treated by ODILIA Mckeon CNP and ODILIA Loo CNP for ongoing psychiatric assessment and medication management.  You had opportunities to participate in therapeutic groups on the unit. You were discharged on 8/22/2024 from [facility] to your home located at [address] [facility] [address] [facility] [address].     Primary Diagnoses:  Schizoaffective disorder, bipolar type, vs Bipolar 1 disorder, vs Schizophrenia  Medication nonadherence     Secondary Diagnosis:   Bipolar 1 disorder, per chart  R/o schizophrenia, per chart  ADHD, per chart  Vitamin D deficiency, per chart  Anxiety, per chart  Insomnia, per chart  Cannabis use, per chart    Health Care Follow-up:   Psychiatric Medication Management Tuesday August 27th at 10:00 AM   Jose Maria Adhikari PA-C with [facility]  Phone [phone] Fax [phone]     You expressed interest in establishing care with a new provider. A referral was made to [facility] and they will call you when they are ready to schedule.  [facility]  [address] [place] [address] [address] [place] [address]  Phone [phone] Fax [phone]    A referral for a short-term, multidisciplinary, intensive case management team, that focuses on early engagement and diversion from civil commitment using person-centered approaches called the Community Alternative Response Team (CART).  If you have not heard from someone from CART by 9/5/2024, reach out to   HERACLIO Rivas, [facility]  Senior Psychiatric Social Worker  Community Alternative Response Team (CART)  [facility]  [address] [facility] [address] [facility] -     Cell - [phone]  Fax: [phone]    Attend all scheduled appointments with your outpatient providers. Call at least 24 hours in advance if you  need to reschedule an appointment to ensure continued access to your outpatient providers.     Major Treatments, Procedures and Findings:  You were provided with: a psychiatric assessment, assessed for medical stability, medication evaluation and/or management, group therapy, individual therapy, and milieu management    Symptoms to Report: feeling more aggressive, increased confusion, losing more sleep, mood getting worse, or thoughts of suicide    Early warning signs can include: increased depression or anxiety sleep disturbances increased thoughts or behaviors of suicide or self-harm  increased unusual thinking, such as paranoia or hearing voices    Safety and Wellness:  Take all medicines as directed.  Make no changes unless your doctor suggests them.      Follow treatment recommendations.  Refrain from alcohol and non-prescribed drugs.  If there is a concern for safety, call 911.    Resources:   Mental Health Crisis Resources  Throughout [place]: call **CRISIS (**925076)  Crisis Text Line: is available for free, 24/7 by texting [place] to 581653  Suicide Awareness Voices of Education (SAVE) (www.save.org): 280-682-JPXN (9027)  The National Suicide Prevention Lifeline is now: 988 Suicide and Crisis Lifeline. Call 988 anytime.  National [place] on Mental Illness (www.[place].jorge.org): 394.609.3401 or 225-685-7422.  Kqvv7scsv: text the word LIFE to 02241 for immediate support and crisis intervention  Mental Health Consumer/Survivor Network of [place] (www.mhcsn.net): 677.350.3184 or 111-469-8086  Mental Health Association of [place] (www.mentalhealth.org): 154.765.8160 or 366-384-5152  Peer Support Connection [place] Warmline (PSC) 1-507.494.9022 Available from 5pm - 9am (7 days a week/365 days a year)  [facility] 1-869.263.6490 Community Outreach for Psych Emergencies    General Medication Instructions:   See your medication sheet(s) for instructions.   Take all medicines as directed.  Make no changes unless your doctor suggests  them.   Go to all your doctor visits.  Be sure to have all your required lab tests. This way, your medicines can be refilled on time.  Do not use any drugs not prescribed by your doctor.  Avoid alcohol.    Advance Directives:   Scanned document on file with [place]? No scanned doc  Is document scanned? Pt states no documents  Honoring Choices Your Rights Handout: Informed and given  Was more information offered? Pt declined    The Treatment team has appreciated the opportunity to work with you. If you have any questions or concerns about your recent admission, you can contact the unit which can receive your call 24 hours a day, 7 days a week. They will be able to get in touch with a Provider if needed. The unit number is [phone].

## 2024-08-20 NOTE — CARE PLAN
Rehab Group    Start time: 1315  End time: 1400  Patient time total: 40 minutes    attended full group     #3 attended   Group Type: occupational therapy   Group Topic Covered: balanced lifestyle, cognitive activities, coping skills, healthy leisure time, Physical activity, and social skills       Group Session Detail:   Exercise and Cognitive Jenga for large motor movement, concentration, FM skills, judgment, cognitive challenge, follow through, coping, mood stabilization, reality-based activity, frustration tolerance, and socialization.       Patient Response/Contribution:  actively engaged       Patient Detail:  Pt was pleasant and engaged in the group activity.  He groused a bit about the physical exercises, though he did participate in them.  Pt needed assist most of the time to solve pictograms, though he was adept at the other cognitive challenges.  Pt was helpful with clean up each time the Jenga tower was knocked over.          [id number] OT Group (2 or more in attendance)      Patient Active Problem List   Diagnosis    Bipolar 1 disorder, depressed (H)    Bipolar 1 disorder, mixed, moderate (H)    Bipolar I disorder, most recent episode mixed, severe with psychotic features (H)    Class 3 severe obesity due to excess calories without serious comorbidity with body mass index (BMI) of 40.0 to 44.9 in adult (H)    Flank pain    Former smoker    GERD (gastroesophageal reflux disease)    History of cannabis abuse    History of diabetes insipidus    Insomnia due to other mental disorder    Mixed hyperlipidemia    Severe recurrent major depression with psychotic features, mood-congruent (H)    Suicidal ideation    Vitamin D insufficiency    Bipolar 1 disorder (H)    Bipolar affective disorder, currently manic, moderate (H)

## 2024-08-20 NOTE — PLAN OF CARE
Problem: Sleep Disturbance  Goal: Adequate Sleep/Rest  Outcome: Progressing   Goal Outcome Evaluation:       Pt slept 7 hours. Pt is on status 15 min safety checks.  No concerns noticed or reported on this shift. No PRNs requested or offered on this shift.

## 2024-08-20 NOTE — PLAN OF CARE
"Goal Outcome Evaluation:    Plan of Care Reviewed With: patient      Patient was up at the start of the shift, reports having slept well - 7 hours recorded.  He did not have any scheduled medications this shift - given PRN nicotine lozenges only.  He is calm and cooperative, appropriately social with peers and staff although a bit guarded.  Reggie SI, HI and SIBI.  Denies hallucinations and contracts for safety on the unit.  Attended groups and ate 100% of meals in the dining area.  Agrees to inform staff if he is having any discomforts.  /86 (BP Location: Left arm, Patient Position: Sitting, Cuff Size: Adult Large)   Pulse 84   Temp 97.2  F (36.2  C) (Temporal)   Resp 16   Ht 1.753 m (5' 9\")   Wt 133.6 kg (294 lb 9.6 oz)   SpO2 99%   BMI 43.50 kg/m        "

## 2024-08-20 NOTE — PLAN OF CARE
BEH IP Unit Acuity Rating Score (UARS)  Patient is given one point for every criteria they meet.    CRITERIA SCORING   On a 72 hour hold, court hold, committed, stay of commitment, or revocation. 0    Patient LOS on BEH unit exceeds 20 days. 0  LOS: 7   Patient under guardianship, 55+, otherwise medically complex, or under age 11. 0   Suicide ideation without relief of precipitating factors. 0   Current plan for suicide. 0   Current plan for homicide. 0   Imminent risk or actual attempt to seriously harm another without relief of factors precipitating the attempt. 0   Severe dysfunction in daily living (ex: complete neglect for self care, extreme disruption in vegetative function, extreme deterioration in social interactions). 1   Recent (last 7 days) or current physical aggression in the ED or on unit. 1   Restraints or seclusion episode in past 72 hours. 0   Recent (last 7 days) or current verbal aggression, agitation, yelling, etc., while in the ED or unit. 1   Active psychosis. 1   Need for constant or near constant redirection (from leaving, from others, etc).  0   Intrusive or disruptive behaviors. 0   Patient requires 3 or more hours of individualized nursing care per 8-hour shift (i.e. for ADLs, meds, therapeutic interventions). 1   TOTAL 5

## 2024-08-20 NOTE — PLAN OF CARE
08/20/24 1500   Interprofessional Rounds   Summary Ready for discharge later this week.   Participants advanced practice nurse;nursing;CTC   Behavioral Team Discussion   Participants ODILIA Mckeon, CHRISTA; [facility]; Mason Robin RN   Progress Stabilization   Anticipated length of stay 2-4 days   Continued Stay Criteria/Rationale Stabilize on depakote   Medical/Physical See H&P   Plan Discharge home   Anticipated Discharge Disposition home with family     PRECAUTIONS AND SAFETY    Behavioral Orders   Procedures    Assault precautions    Cheeking Precautions (behavioral units)    Code 1 - Restrict to Unit    Routine Programming     As clinically indicated    Status 15     Every 15 minutes.    Suicide precautions: Suicide Risk: HIGH     Patients on Suicide Precautions should have a Combination Diet ordered that includes a Diet selection(s) AND a Behavioral Tray selection for Safe Tray - with utensils, or Safe Tray - NO utensils       Order Specific Question:   Suicide Risk     Answer:   HIGH       Safety  Safety WDL: WDL  Patient Location: patient room, own  Observed Behavior: sitting, calm, walking  Observed Behavior (Comment): Meals in dining area, playing guitar and watching TV in the lounge  Safety Measures: environmental rounds completed  Suicidality: Status 15  Assault: status 15  Elopement Assessment: Statements about wanting to leave  Elopement Interventions: status 15, behavioral scrubs (padanyel)

## 2024-08-21 LAB
VALPROATE FREE MFR SERPL: ABNORMAL %
VALPROATE FREE SERPL-MCNC: <7 UG/ML
VALPROATE SERPL-MCNC: 48 UG/ML

## 2024-08-21 PROCEDURE — 99232 SBSQ HOSP IP/OBS MODERATE 35: CPT | Performed by: REGISTERED NURSE

## 2024-08-21 PROCEDURE — 124N000002 HC R&B MH UMMC

## 2024-08-21 PROCEDURE — 250N000013 HC RX MED GY IP 250 OP 250 PS 637: Performed by: EMERGENCY MEDICINE

## 2024-08-21 PROCEDURE — 250N000013 HC RX MED GY IP 250 OP 250 PS 637: Performed by: REGISTERED NURSE

## 2024-08-21 PROCEDURE — 97150 GROUP THERAPEUTIC PROCEDURES: CPT | Mod: GO

## 2024-08-21 PROCEDURE — 90853 GROUP PSYCHOTHERAPY: CPT

## 2024-08-21 RX ORDER — PROPRANOLOL HYDROCHLORIDE 10 MG/1
10 TABLET ORAL 3 TIMES DAILY PRN
Qty: 90 TABLET | Refills: 0 | Status: SHIPPED | OUTPATIENT
Start: 2024-08-21

## 2024-08-21 RX ORDER — OLANZAPINE 20 MG/1
20 TABLET ORAL AT BEDTIME
Qty: 30 TABLET | Refills: 0 | Status: SHIPPED | OUTPATIENT
Start: 2024-08-21

## 2024-08-21 RX ORDER — DIVALPROEX SODIUM 250 MG/1
750 TABLET, DELAYED RELEASE ORAL AT BEDTIME
Qty: 90 TABLET | Refills: 0 | Status: SHIPPED | OUTPATIENT
Start: 2024-08-22 | End: 2024-08-21

## 2024-08-21 RX ORDER — MIRTAZAPINE 7.5 MG/1
7.5 TABLET, FILM COATED ORAL
Qty: 30 TABLET | Refills: 0 | Status: SHIPPED | OUTPATIENT
Start: 2024-08-21

## 2024-08-21 RX ORDER — METFORMIN HCL 500 MG
500 TABLET, EXTENDED RELEASE 24 HR ORAL
Qty: 30 TABLET | Refills: 0 | Status: SHIPPED | OUTPATIENT
Start: 2024-08-21

## 2024-08-21 RX ORDER — DIVALPROEX SODIUM 250 MG/1
750 TABLET, DELAYED RELEASE ORAL AT BEDTIME
Qty: 30 TABLET | Refills: 0 | Status: SHIPPED | OUTPATIENT
Start: 2024-08-22

## 2024-08-21 RX ADMIN — NICOTINE POLACRILEX 4 MG: 2 LOZENGE ORAL at 20:35

## 2024-08-21 RX ADMIN — NICOTINE POLACRILEX 4 MG: 2 LOZENGE ORAL at 18:37

## 2024-08-21 RX ADMIN — DIVALPROEX SODIUM 750 MG: 500 TABLET, DELAYED RELEASE ORAL at 20:31

## 2024-08-21 RX ADMIN — METFORMIN HYDROCHLORIDE 500 MG: 500 TABLET, EXTENDED RELEASE ORAL at 17:59

## 2024-08-21 RX ADMIN — NICOTINE POLACRILEX 4 MG: 2 LOZENGE ORAL at 07:25

## 2024-08-21 RX ADMIN — OLANZAPINE 20 MG: 20 TABLET, ORALLY DISINTEGRATING ORAL at 20:31

## 2024-08-21 RX ADMIN — NICOTINE POLACRILEX 4 MG: 2 LOZENGE ORAL at 14:34

## 2024-08-21 RX ADMIN — NICOTINE POLACRILEX 4 MG: 2 LOZENGE ORAL at 11:36

## 2024-08-21 ASSESSMENT — ACTIVITIES OF DAILY LIVING (ADL)
LAUNDRY: WITH SUPERVISION
HYGIENE/GROOMING: INDEPENDENT
ADLS_ACUITY_SCORE: 30
DRESS: INDEPENDENT;STREET CLOTHES
ADLS_ACUITY_SCORE: 30
ORAL_HYGIENE: INDEPENDENT
ADLS_ACUITY_SCORE: 30

## 2024-08-21 NOTE — PLAN OF CARE
Upcoming Meetings and Dates/Important Information  Days since IP admission 8  Team Note Due Wednesday  No future appointments.     Next Steps  Establish care with new psych provider    Assessment/Intervention/Current Symptoms and Care Coordination  Chart Review  Met with team to discuss patient care.  Called [facility] and pt completed phone screen. Intake documents will be emailed to [facility] for him to complete.  Faxed [facility]INCE to [facility] at [phone]  Completed [facility] intake paperwork with pt.  Called [facility] and requested to schedule pt with initial appointment. They said they need a few days to review the file.    Discharge Plan or Goal  Dispo Plan: Return home  Transportation: Father or cab  Medication: Wants in hand  Provisional discharge: Not needed  Safety plan complete?: Completed 8/20/24  Appointments:   Psychiatry at [facility]     Barriers to Discharge  patient requires further psychiatric stabilization due to current symptomology, medication management with possible adjustments    Expected Discharge Date: 08/30/2024        Discharge Comments: Requries ongoing stabilization          Referral Status  No new referral made    Legal Status  voluntary    Contacts  Extended Emergency Contact Information  Primary Emergency Contact: AMBROSE JASON  Mobile Phone: [phone]  Relation: Brother    Secondary Emergency Contact: Micheal Jason  Mobile Phone: [phone]  Relation: Father

## 2024-08-21 NOTE — PROGRESS NOTES
"[facility], [place]   Psychiatric Progress Note    Hospital Day: 8  Interim History:   From H&P:  This patient is a 38 year old male with a history of  Bipolar 1 disorder, ADHD, insomnia, suicidal ideation, depression, anxiety, and head injuries, who presented to [facility] ED on 8/9/2024 with psychosis and priti in the context of suspected medication nonadherence.     Team meeting report:  The patient's care was discussed with the treatment team during the daily team meeting and staff's chart notes were reviewed.  Patient attended groups.  He was calm, pleasant, and engaged.  Patient spent time in the milieu watching TV.  He used his cell phone with supervision to check status of jobs for which he has applied.  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  He denied suicidal ideation.  He reported sleeping well.  He yelled loudly during a baseball game.  Per staff documentation, patient slept 7 hours last night.  He took PRN nicotine gum x 4.      Met with patient.  Today, patient reports his mood is \"great\".  He is eating and sleeping well.  He denies frequent nighttime awakenings or increased sleep latency.  Patient reports becoming intermittently frustrated, but has been able to self regulate his behaviors.  He denies suicidal ideation or homicidal ideation.  Patient reports a history of seeing things that are not there, such as \"a giant spider\" on his wall and a \"black bird with an orange head\". Patient states he was previously told hallucinations could be a result of his antipsychotic medication.  Provider discusses current medications and potential side effects.  Patient denies current auditory hallucinations or visual hallucinations.  He verbalizes some paranoid beliefs about being monitored by a company called [employer].  He then laughs this off, and asks why anyone would want to monitor him.  Patient denies agitation or irritability.  He denies pain or other physical " complaints.  Patient would like to discharge home as soon as Depakote level results.  Patient is currently waiting to screen with potential outpatient provider.  Ideally, patient will discharge with an outpatient appointment established.    Medications:     Current Facility-Administered Medications   Medication Dose Route Frequency Provider Last Rate Last Admin    divalproex sodium delayed-release (DEPAKOTE) DR tablet 750 mg  750 mg Oral At Bedtime Geneva Corley APRN CNP   750 mg at 08/20/24 2009    metFORMIN (GLUCOPHAGE XR) 24 hr tablet 500 mg  500 mg Oral Daily with supper Carlos Urbina MD   500 mg at 08/20/24 1808    OLANZapine zydis (zyPREXA) ODT tab 20 mg  20 mg Oral At Bedtime Geneva Corley APRN CNP   20 mg at 08/20/24 2009     Allergies:     Allergies   Allergen Reactions    No Known Drug Allergy      Labs:     Recent Results (from the past 672 hour(s))   Urine Drug Screen Panel    Collection Time: 08/09/24  2:25 PM   Result Value Ref Range    Amphetamines Urine Screen Negative Screen Negative    Barbituates Urine Screen Negative Screen Negative    Benzodiazepine Urine Screen Negative Screen Negative    Cannabinoids Urine Screen Negative Screen Negative    Cocaine Urine Screen Negative Screen Negative    Fentanyl Qual Urine Screen Negative Screen Negative    Opiates Urine Screen Negative Screen Negative    PCP Urine Screen Negative Screen Negative   CBC with platelets and differential    Collection Time: 08/09/24  3:33 PM   Result Value Ref Range    WBC Count 11.9 (H) 4.0 - 11.0 10e3/uL    RBC Count 4.76 4.40 - 5.90 10e6/uL    Hemoglobin 14.2 13.3 - 17.7 g/dL    Hematocrit 41.3 40.0 - 53.0 %    MCV 87 78 - 100 fL    MCH 29.8 26.5 - 33.0 pg    MCHC 34.4 31.5 - 36.5 g/dL    RDW 12.6 10.0 - 15.0 %    Platelet Count 243 150 - 450 10e3/uL    % Neutrophils 66 %    % Lymphocytes 26 %    % Monocytes 5 %    % Eosinophils 2 %    % Basophils 1 %    % Immature Granulocytes 0 %    NRBCs per 100 WBC 0 <1  /100    Absolute Neutrophils 7.9 1.6 - 8.3 10e3/uL    Absolute Lymphocytes 3.1 0.8 - 5.3 10e3/uL    Absolute Monocytes 0.6 0.0 - 1.3 10e3/uL    Absolute Eosinophils 0.2 0.0 - 0.7 10e3/uL    Absolute Basophils 0.1 0.0 - 0.2 10e3/uL    Absolute Immature Granulocytes 0.0 <=0.4 10e3/uL    Absolute NRBCs 0.0 10e3/uL   Hemoglobin A1c    Collection Time: 08/09/24  3:33 PM   Result Value Ref Range    Hemoglobin A1C 6.4 (H) <5.7 %   Lithium level    Collection Time: 08/09/24  3:39 PM   Result Value Ref Range    Lithium 1.08 0.60 - 1.20 mmol/L   Comprehensive metabolic panel    Collection Time: 08/09/24  4:14 PM   Result Value Ref Range    Sodium 136 135 - 145 mmol/L    Potassium 3.5 3.4 - 5.3 mmol/L    Carbon Dioxide (CO2) 24 22 - 29 mmol/L    Anion Gap 14 7 - 15 mmol/L    Urea Nitrogen 12.3 6.0 - 20.0 mg/dL    Creatinine 0.83 0.67 - 1.17 mg/dL    GFR Estimate >90 >60 mL/min/1.73m2    Calcium 10.5 (H) 8.8 - 10.4 mg/dL    Chloride 98 98 - 107 mmol/L    Glucose 170 (H) 70 - 99 mg/dL    Alkaline Phosphatase 75 40 - 150 U/L    AST 25 0 - 45 U/L    ALT 39 0 - 70 U/L    Protein Total 7.8 6.4 - 8.3 g/dL    Albumin 4.7 3.5 - 5.2 g/dL    Bilirubin Total 0.6 <=1.2 mg/dL   TSH with free T4 reflex    Collection Time: 08/09/24  4:14 PM   Result Value Ref Range    TSH 2.07 0.30 - 4.20 uIU/mL   Asymptomatic COVID-19 Virus (Coronavirus) by PCR Nasopharyngeal    Collection Time: 08/10/24  8:40 AM    Specimen: Nasopharyngeal; Swab   Result Value Ref Range    SARS CoV2 PCR Negative Negative   EKG 12-lead, complete    Collection Time: 08/13/24  5:06 PM   Result Value Ref Range    Systolic Blood Pressure  mmHg    Diastolic Blood Pressure  mmHg    Ventricular Rate 80 BPM    Atrial Rate 80 BPM    NC Interval 156 ms    QRS Duration 90 ms     ms    QTc 431 ms    P Axis 45 degrees    R AXIS 1 degrees    T Axis 21 degrees    Interpretation ECG       Sinus rhythm  Moderate voltage criteria for LVH, may be normal variant  Borderline ECG  No  "previous ECGs available  Confirmed by MD SHIRLEY, LEXIE ([id number]) on 8/14/2024 11:41:50 PM     Vitamin D Deficiency    Collection Time: 08/14/24  8:02 AM   Result Value Ref Range    Vitamin D, Total (25-Hydroxy) 32 20 - 50 ng/mL   Vitamin B12    Collection Time: 08/14/24  8:02 AM   Result Value Ref Range    Vitamin B12 865 232 - 1,245 pg/mL   Folate    Collection Time: 08/14/24  8:02 AM   Result Value Ref Range    Folic Acid 16.4 4.6 - 34.8 ng/mL   Lipid panel reflex to direct LDL    Collection Time: 08/14/24  8:02 AM   Result Value Ref Range    Cholesterol 143 <200 mg/dL    Triglycerides 223 (H) <150 mg/dL    Direct Measure HDL 25 (L) >=40 mg/dL    LDL Cholesterol Calculated 73 <=100 mg/dL    Non HDL Cholesterol 118 <130 mg/dL   Extra Purple Top Tube    Collection Time: 08/14/24  8:02 AM   Result Value Ref Range    Hold Specimen JIC    Hepatic panel    Collection Time: 08/20/24  7:58 AM   Result Value Ref Range    Protein Total 6.2 (L) 6.4 - 8.3 g/dL    Albumin 3.7 3.5 - 5.2 g/dL    Bilirubin Total 0.2 <=1.2 mg/dL    Alkaline Phosphatase 67 40 - 150 U/L    AST 25 0 - 45 U/L    ALT 62 0 - 70 U/L    Bilirubin Direct <0.20 0.00 - 0.30 mg/dL       Psychiatric Examination:   Temp: 97.9  F (36.6  C) Temp src: Oral BP: 126/88 Pulse: 91   Resp: 18 SpO2: 97 % O2 Device: None (Room air)    Weight is 294 lbs 9.6 oz  Body mass index is 43.5 kg/m .    Appearance:  Awake, alert, adequately groomed, dressed in hospital scrubs with shirt from home.  Attitude:  Calm, cooperative.  Eye Contact:  good  Mood:  \"Great.\"  Affect:   Blunted, more relaxed but still tense, appears mildly suspicious.  Speech:  Clear, coherent, appropriate rate and volume, occasionally tangential.  Psychomotor Behavior:  No psychomotor agitation noted.  Thought Process:  [place].   Associations:  no loose associations  Thought Content:  No evidence of suicidal ideation or homicidal ideation, denies auditory and visual hallucinations, mild paranoia evident on " "exam.  Insight:  fair  Judgement:  fair  Oriented to:  Not formally tested, grossly intact.  Attention Span and Concentration:  Fair, able to engage in interview.  Recent and Remote Memory:  Not formally tested, adequate for interview.    Precautions:     Behavioral Orders   Procedures    Assault precautions    Cheeking Precautions (behavioral units)    Code 1 - Restrict to Unit    Routine Programming     As clinically indicated    Status 15     Every 15 minutes.    Suicide precautions: Suicide Risk: HIGH     Patients on Suicide Precautions should have a Combination Diet ordered that includes a Diet selection(s) AND a Behavioral Tray selection for Safe Tray - with utensils, or Safe Tray - NO utensils       Order Specific Question:   Suicide Risk     Answer:   HIGH     Diagnoses:     Primary Diagnoses:  Schizoaffective disorder, bipolar type, vs Bipolar 1 disorder, vs Schizophrenia  Medication nonadherence     Secondary Diagnosis:   Bipolar 1 disorder, per chart  R/o schizophrenia, per chart  ADHD, per chart  Vitamin D deficiency, per chart  Anxiety, per chart  Insomnia, per chart  Cannabis use, per chart    Clinically Significant Risk Factors Present on Admission        # Severe Obesity: Estimated body mass index is 43.5 kg/m  as calculated from the following:    Height as of this encounter: 1.753 m (5' 9\").    Weight as of this encounter: 133.6 kg (294 lb 9.6 oz).          Hospital Course:     Plan on Admission:  Admit to [address]   Legal Status: Voluntary   Continue PTA Zyprexa 20mg HS, Lithium 1200mg HS, Metformin 500mg PM, PRN propranolol 10mg TID, and PRN Haldol 5mg BID.  PTA Ambien was not initiated on admission.  Start Invega 3mg AM, mirtazapine 7.5mg HS, PRN hydroxyzine, PRN Zyprexa, PRN trazodone, PRN NRT.    Start Safety precautions:  suicide, cheeking, elopement, assault.  Order additional Labs: TSH, lipids, HgbA1c, B12/folate, vitamin D.  Order EKG   Start SIO 1:1 for verbal aggression/assault " risk and intrusiveness.     8/14:  Discontinue SIO.  Increase Invega to 6 mg.  Patient would like to discontinue lithium.  Will cross-titrate Depakote and lithium.  Decrease lithium to 975 mg at bedtime and initiate Depakote  mg twice daily.  Schedule as needed propranolol and senna.  Will discontinue as needed hydroxyzine due to patient reports of no benefit.  Will initiate as needed gabapentin for anxiety.  8/15: Patient reports improvement in agitation and irritability with initiation of Depakote yesterday.  He would like to continue cross titration of Depakote and lithium.  Will increase Depakote to 500 mg at bedtime.  Will decrease lithium to 600 mg.  8/16:  Patient reports he is feeling better today.  He denies suicidal ideation or homicidal ideation.  He continues to endorse some anxiety.  Agitation and irritability are improved.  Patient is experiencing daytime sedation, likely due to Depakote DR.  He is agreeable with consolidating Depakote DR to bedtime, starting tomorrow.  Patient would like to continue lithium taper.  Patient is no longer interested in Invega Sustenna MCGOVERN.  Given patient's improvement in his symptoms on Depakote DR and Zyprexa, will discontinue Invega per patient's request.  Propranolol changed to PRN per patient's request.  Mirtazapine changed to PRN per patient's request.  8/19:  Patient continues to improve.  His affect  is blunted and tense.  No agitation or irritability.  Thought process is concrete and a bit tangential.  Reports subjective improvement with Depakote in place of lithium.    8/20:  No medication changes today.  Patient would like to continue with DR formulation of Depakote.  He declined an increase in metformin to 500 mg twice daily.  Awaiting Depakote level to determine whether further titration is warranted.  Hepatic level today was within normal limits.  8/21:  No medication changes today.  Patient's primary complaints on admission, including agitation and  insomnia, have significantly improved.  Patient is tolerating current medication regimen well and feels ready for discharge.  Remains mildly paranoid.  Still awaiting Depakote level to result.    Plan:   Today's Changes:  - No medication changes today.  Patient appears to be stabilizing on current medication regimen.  - Plan to discharge after Depakote level has resulted.    Target psychiatric symptoms and interventions:    # Psychosis   - Continue Zyprexa 20mg HS.     # Mood  - Continue Depakote  mg HS.   - Continue mirtazapine 7.5mg HS PRN.     # Anxiety   - Continue propranolol 10 mg TID PRN.  - Continue gabapentin 100 mg TID PRN.    # Insomnia  - Mirtazapine as above.     # Agitation  - Continue Haldol 5mg BID PRN.   - Continue Zyprexa 10mg TID PRN for severe agitation.     # Nicotine withdrawal  - Continue Commit 2-4mg every hour PRN.     # Tremor  - Continue propranolol as above.    # Pre-diabetes   # Antipsychotic associated weight gain  - HgbA1c 6.4   - Continue metformin 500 mg with supper.     Risks, benefits, and alternatives discussed at length with patient.     Disposition:    Patient will likely discharge to his father's home, where he currently resides.  He would likely benefit from IOP.  He would likely benefit from therapy focused on development of life skills and coping skills.  Assume patient will return to [facility] for outpatient psychiatric medication management.     Reason for ongoing admission:  patient is unable to care for self due to severe psychosis or priti.      Estimated length of stay:  7-14 days.    Medical:  --Internal medicine to follow up for medical problems     Pertinent labs/imaging:  --8/14:  HDL 25,     Consults:   --Care was coordinated with the treatment team.   --The patient was consulted on nature of illness and treatment options.        Geneva Corley, APRN, CNP     This note was created with the help of Dragon dictation system. All  grammatical/typing errors or context distortion are unintentional and inherent to software.    Attestation:  Patient has been seen and evaluated by me, ODILIA Mckeon, CNP.  I spent >35 min spent on the date of the encounter in chart review, patient visit, review of tests, documentation, care coordination, and/or discussion with other providers about the issues documented above.

## 2024-08-21 NOTE — PLAN OF CARE
Problem: Sleep Disturbance  Goal: Adequate Sleep/Rest  Outcome: Progressing   Goal Outcome Evaluation:  NOC Shift Report    Pt was in bed at the beginning of the shift. Breathing was regular, spontaneous, and unlabored. Pt did not present with any medical concerns this shift. There were no  PRNs given. The plan of care is ongoing.       Pt appears to sleep for 7 hours

## 2024-08-21 NOTE — PLAN OF CARE
"  Problem: Psychotic Signs/Symptoms  Goal: Improved Behavioral Control (Psychotic Signs/Symptoms)  Outcome: Not Progressing   Goal Outcome Evaluation:    Plan of Care Reviewed With: patient      Pt was bed resting in room at start of shift listening to music. Pt stated mood is \"I'm fine\" with a blunted affect. Behavior is labile and dismissive. Denies pain, anxiety, depression, SI/HI/AVH.    Pt stated \" They lied to me about my lab work, you all are liars. You are going to lie to me at some point tonight\" Then he laughed out loud.    Pt was later over heard screaming in the milieu. When writer approached him and asked him if everything was ok, he didn't anything and continued  watching TV like nothing happened.     Requested and used his phone with supervision. Attended group partially. Medication compliant. Ate 100% of his dinner in the dinning area.    /88 (Patient Position: Sitting)   Pulse 91   Temp 98.7  F (37.1  C) (Oral)   Resp 16   Ht 1.753 m (5' 9\")   Wt 133.6 kg (294 lb 9.6 oz)   SpO2 95%   BMI 43.50 kg/m             "

## 2024-08-21 NOTE — PLAN OF CARE
"Problem: Psychotic Signs/Symptoms  Goal: Enhanced Social, Occupational or Functional Skills (Psychotic Signs/Symptoms)  Intervention: Promote Social, Occupational and Functional Ability    Problem: Adult Inpatient Plan of Care  Goal: Readiness for Transition of Care  Outcome: Progressing     Goal Outcome Evaluation:    Plan of Care Reviewed With: patient      Pt appears comfortable on unit, watching TV much of evening. 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 Pt denied suicidal ideation, indicated he slept like a \"baby\". Denies pain or other concerns. Loudly yelled in lounge during baseball game. Pt took bedtime medications and was cooperative with cheeking precautions.     /88 (BP Location: Left arm, Patient Position: Sitting, Cuff Size: Adult Large)   Pulse 91   Temp 97.9  F (36.6  C) (Oral)   Resp 18   Ht 1.753 m (5' 9\")   Wt 133.6 kg (294 lb 9.6 oz)   SpO2 97%   BMI 43.50 kg/m               "

## 2024-08-21 NOTE — PLAN OF CARE
BEH IP Unit Acuity Rating Score (UARS)  Patient is given one point for every criteria they meet.    CRITERIA SCORING   On a 72 hour hold, court hold, committed, stay of commitment, or revocation. 0    Patient LOS on BEH unit exceeds 20 days. 0  LOS: 8   Patient under guardianship, 55+, otherwise medically complex, or under age 11. 0   Suicide ideation without relief of precipitating factors. 0   Current plan for suicide. 0   Current plan for homicide. 0   Imminent risk or actual attempt to seriously harm another without relief of factors precipitating the attempt. 0   Severe dysfunction in daily living (ex: complete neglect for self care, extreme disruption in vegetative function, extreme deterioration in social interactions). 1   Recent (last 7 days) or current physical aggression in the ED or on unit. 1   Restraints or seclusion episode in past 72 hours. 0   Recent (last 7 days) or current verbal aggression, agitation, yelling, etc., while in the ED or unit. 1   Active psychosis. 1   Need for constant or near constant redirection (from leaving, from others, etc).  0   Intrusive or disruptive behaviors. 0   Patient requires 3 or more hours of individualized nursing care per 8-hour shift (i.e. for ADLs, meds, therapeutic interventions). 1   TOTAL 5

## 2024-08-21 NOTE — PROGRESS NOTES
"  Rehab Group    Start time: 1015  End time: 1145  Patient time total: 90 minutes    attended full group     #3 attended   Group Type: OT Clinic   Group Topic Covered: balanced lifestyle, coping skills, and healthy leisure time     Group Session Detail:  Occupational Therapy Clinic group to facilitate coping skill exploration, use of cognitive skills and problem solving, creative expression, clinical observation and facilitation of social, cognitive, and kinesthetic performance skills.       Patient Response/Contribution:  cooperative with task, organized, supportive of peers, socially appropriate, and attentive     Patient Detail:  Initiated group and readily selected a coloring task to work on. Attended to details and planning. Socialized with a peer the majority of group. Listened to peers unending story compassionately and unsuccessfully attempted to change the topic when it became negative. Enjoyed the music and reminisced about where he was when the songs came out. Easily redirected when political conversation became an \"all of them...\" and accusatory .      [id number] OT Group (2 or more in attendance)      Patient Active Problem List   Diagnosis    Bipolar 1 disorder, depressed (H)    Bipolar 1 disorder, mixed, moderate (H)    Bipolar I disorder, most recent episode mixed, severe with psychotic features (H)    Class 3 severe obesity due to excess calories without serious comorbidity with body mass index (BMI) of 40.0 to 44.9 in adult (H)    Flank pain    Former smoker    GERD (gastroesophageal reflux disease)    History of cannabis abuse    History of diabetes insipidus    Insomnia due to other mental disorder    Mixed hyperlipidemia    Severe recurrent major depression with psychotic features, mood-congruent (H)    Suicidal ideation    Vitamin D insufficiency    Bipolar 1 disorder (H)    Bipolar affective disorder, currently manic, moderate (H)      "

## 2024-08-21 NOTE — PLAN OF CARE
"  Problem: Psychotic Signs/Symptoms  Goal: Optimal Cognitive Function (Psychotic Signs/Symptoms)  8/21/2024 1050 by Yana Mccain RN  Outcome: Progressing   Goal Outcome Evaluation:    Plan of Care Reviewed With: patient      \"I'm better, I'm more calm, able, who cares?\" Depression is at a 3/10, anxiety 4/10, both have improved. When asked about racing thoughts response was \"questionable.\" Denies hallucinations, SI, SIB, and HI. Attends \"some\" of the groups. Has been singing loudly intermittently in room and hallway wearing the headphones. Was resting in bed this morning after breakfast. Patient was verbalizing anger and frustration about waiting for the Depakote level results. Patient mentioned might just leave the unit. Patient was encouraged to wait for results. Staff asked if could take BP because was elevated this morning. Patient became angry because staff raised sleeve up to apply BP cuff. Staff took BP over sleeve and patient  kept bending arm. Staff said cannot  get an accurate BP bending the arm and said if you don't want your BP taken we don't have to take it. Patient ripped BP cuff off of arm. Patient was heard yelling in room a short time later. Patient then came to desk and asked if could use phone to look for jobs. Patient was told would have to wait until 4pm.           "

## 2024-08-21 NOTE — PLAN OF CARE
Group Attendance:  attended full group    Time session began: 2013  Time session ended: 2050  Patient's total time in group: 47    Total # Attendees   3   Group Type psychotherapeutic     Group Topic Covered insight improvement     Group Session Detail This group focused on insight improvement, which was followed by discussions around discharge planning. Patients discussed their current status in terms of mood/symptoms. They then discussed their current hopes and plans for discharge. Feedback focused on ways to use help with planning and ways to target specifically identified needs in after-care engagement.     Patient's response to the group topic/interactions:  cooperative with task, expressed readiness to alter behaviors, supportive of peers, and socially appropriate     Patient Details: Pt attended the group. Asked questions and provided feedback.       [id number] - Group psychotherapy - 1 Session    Patient Active Problem List   Diagnosis    Bipolar 1 disorder, depressed (H)    Bipolar 1 disorder, mixed, moderate (H)    Bipolar I disorder, most recent episode mixed, severe with psychotic features (H)    Class 3 severe obesity due to excess calories without serious comorbidity with body mass index (BMI) of 40.0 to 44.9 in adult (H)    Flank pain    Former smoker    GERD (gastroesophageal reflux disease)    History of cannabis abuse    History of diabetes insipidus    Insomnia due to other mental disorder    Mixed hyperlipidemia    Severe recurrent major depression with psychotic features, mood-congruent (H)    Suicidal ideation    Vitamin D insufficiency    Bipolar 1 disorder (H)    Bipolar affective disorder, currently manic, moderate (H)

## 2024-08-22 VITALS
OXYGEN SATURATION: 96 % | BODY MASS INDEX: 44.03 KG/M2 | WEIGHT: 297.3 LBS | HEIGHT: 69 IN | DIASTOLIC BLOOD PRESSURE: 84 MMHG | TEMPERATURE: 97.7 F | HEART RATE: 82 BPM | SYSTOLIC BLOOD PRESSURE: 111 MMHG | RESPIRATION RATE: 18 BRPM

## 2024-08-22 PROCEDURE — 99239 HOSP IP/OBS DSCHRG MGMT >30: CPT | Performed by: NURSE PRACTITIONER

## 2024-08-22 PROCEDURE — 250N000013 HC RX MED GY IP 250 OP 250 PS 637: Performed by: REGISTERED NURSE

## 2024-08-22 PROCEDURE — 97150 GROUP THERAPEUTIC PROCEDURES: CPT | Mod: GO

## 2024-08-22 RX ADMIN — NICOTINE POLACRILEX 4 MG: 2 LOZENGE ORAL at 08:55

## 2024-08-22 ASSESSMENT — ACTIVITIES OF DAILY LIVING (ADL)
LAUNDRY: WITH SUPERVISION
ADLS_ACUITY_SCORE: 30
DRESS: INDEPENDENT;STREET CLOTHES
ADLS_ACUITY_SCORE: 30
ORAL_HYGIENE: INDEPENDENT
HYGIENE/GROOMING: INDEPENDENT
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
## 2024-08-22 NOTE — PLAN OF CARE
"  Problem: Adult Inpatient Plan of Care  Goal: Plan of Care Review  Description: The Plan of Care Review/Shift note should be completed every shift.  The Outcome Evaluation is a brief statement about your assessment that the patient is improving, declining, or no change.  This information will be displayed automatically on your shift  note.  Outcome: Adequate for Care Transition  Goal: Patient-Specific Goal (Individualized)  Description: You can add care plan individualizations to a care plan. Examples of Individualization might be:  \"Parent requests to be called daily at 9am for status\", \"I have a hard time hearing out of my right ear\", or \"Do not touch me to wake me up as it startles  me\".  Outcome: Adequate for Care Transition  Goal: Absence of Hospital-Acquired Illness or Injury  Outcome: Adequate for Care Transition  Intervention: Prevent Skin Injury  Recent Flowsheet Documentation  Taken 8/22/2024 1023 by Mahnaz Hendrix RN  Body Position: position changed independently  Goal: Optimal Comfort and Wellbeing  Outcome: Adequate for Care Transition  Intervention: Provide Person-Centered Care  Recent Flowsheet Documentation  Taken 8/22/2024 1023 by Mahnaz Hendrix RN  Trust Relationship/Rapport:   care explained   choices provided   empathic listening provided   questions answered   questions encouraged   thoughts/feelings acknowledged   reassurance provided  Goal: Readiness for Transition of Care  Outcome: Adequate for Care Transition     Problem: Adult Behavioral Health Plan of Care  Goal: Plan of Care Review  Outcome: Adequate for Care Transition  Flowsheets (Taken 8/22/2024 1023)  Patient Agreement with Plan of Care: agrees  Goal: Patient-Specific Goal (Individualization)  Description: You can add care plan individualizations to a care plan. Examples of Individualization might be:  \"Parent requests to be called daily at 9am for status\", \"I have a hard time hearing out of my right ear\", or \"Do not " "touch me to wake me up as it startles  me\".  Outcome: Adequate for Care Transition  Goal: Adheres to Safety Considerations for Self and Others  Outcome: Adequate for Care Transition  Intervention: Develop and Maintain Individualized Safety Plan  Recent Flowsheet Documentation  Taken 8/22/2024 1023 by Mahnaz Hendrix RN  Safety Measures:   environmental rounds completed   safety rounds completed   suicide check-in completed  Goal: Absence of New-Onset Illness or Injury  Outcome: Adequate for Care Transition  Intervention: Identify and Manage Fall Risk  Recent Flowsheet Documentation  Taken 8/22/2024 1023 by Mahnaz Hendrix RN  Safety Measures:   environmental rounds completed   safety rounds completed   suicide check-in completed  Goal: Optimized Coping Skills in Response to Life Stressors  Outcome: Adequate for Care Transition  Intervention: Promote Effective Coping Strategies  Recent Flowsheet Documentation  Taken 8/22/2024 1023 by Mahnaz Hendrix RN  Supportive Measures:   active listening utilized   verbalization of feelings encouraged  Taken 8/22/2024 0914 by Mahnaz Hendrix RN  Supportive Measures: active listening utilized  Goal: Develops/Participates in Therapeutic Wirtz to Support Successful Transition  Outcome: Adequate for Care Transition  Intervention: Foster Therapeutic Wirtz  Recent Flowsheet Documentation  Taken 8/22/2024 1023 by Mahnaz Hendrix RN  Trust Relationship/Rapport:   care explained   choices provided   empathic listening provided   questions answered   questions encouraged   thoughts/feelings acknowledged   reassurance provided     Problem: Suicide Risk  Goal: Absence of Self-Harm  8/22/2024 1233 by Mahnaz Hendrix RN  Outcome: Adequate for Care Transition  8/22/2024 1116 by Mahnaz Hendrix RN  Outcome: Progressing  Intervention: Promote Psychosocial Wellbeing  Recent Flowsheet Documentation  Taken 8/22/2024 1023 by Mahnaz Hendrix" RN  Supportive Measures:   active listening utilized   verbalization of feelings encouraged  Taken 8/22/2024 0914 by Mahnaz Hendrix RN  Supportive Measures: active listening utilized     Problem: Psychotic Signs/Symptoms  Goal: Improved Behavioral Control (Psychotic Signs/Symptoms)  Outcome: Adequate for Care Transition  Goal: Optimal Cognitive Function (Psychotic Signs/Symptoms)  Outcome: Adequate for Care Transition  Intervention: Support and Promote Cognitive Ability  Recent Flowsheet Documentation  Taken 8/22/2024 1023 by Mahnaz Hendrix RN  Trust Relationship/Rapport:   care explained   choices provided   empathic listening provided   questions answered   questions encouraged   thoughts/feelings acknowledged   reassurance provided  Goal: Increased Participation and Engagement (Psychotic Signs/Symptoms)  Outcome: Adequate for Care Transition  Intervention: Facilitate Participation and Engagement  Recent Flowsheet Documentation  Taken 8/22/2024 1023 by Mahnaz Hendrix RN  Supportive Measures:   active listening utilized   verbalization of feelings encouraged  Diversional Activity: television  Taken 8/22/2024 0914 by Mahnaz Hendrix RN  Supportive Measures: active listening utilized  Goal: Improved Mood Symptoms (Psychotic Signs/Symptoms)  Outcome: Adequate for Care Transition  Intervention: Optimize Emotion and Mood  Recent Flowsheet Documentation  Taken 8/22/2024 1023 by Mahnaz Hendrix RN  Supportive Measures:   active listening utilized   verbalization of feelings encouraged  Diversional Activity: television  Taken 8/22/2024 0914 by Mahnaz Hendrix RN  Supportive Measures: active listening utilized  Goal: Improved Psychomotor Symptoms (Psychotic Signs/Symptoms)  Outcome: Adequate for Care Transition  Intervention: Manage Psychomotor Movement  Recent Flowsheet Documentation  Taken 8/22/2024 1023 by Mahnaz Hendrix RN  Patient Performed Hygiene: dressed  Diversional  Activity: television  Activity (Behavioral Health): up ad teresa  Goal: Decreased Sensory Symptoms (Psychotic Signs/Symptoms)  Outcome: Adequate for Care Transition  Goal: Improved Sleep (Psychotic Signs/Symptoms)  Outcome: Adequate for Care Transition  Goal: Enhanced Social, Occupational or Functional Skills (Psychotic Signs/Symptoms)  Outcome: Adequate for Care Transition  Intervention: Promote Social, Occupational and Functional Ability  Recent Flowsheet Documentation  Taken 8/22/2024 1023 by Mahnaz Hendrix RN  Trust Relationship/Rapport:   care explained   choices provided   empathic listening provided   questions answered   questions encouraged   thoughts/feelings acknowledged   reassurance provided     Problem: Sleep Disturbance  Goal: Adequate Sleep/Rest  Outcome: Adequate for Care Transition   Goal Outcome Evaluation:    Plan of Care Reviewed With: patient        The AVS was verbally reviewed with the patient and a copy was given to him to take at time of discharge. All ordered medications were reviewed with the patient including name, dose, when and how to take them. Patient verbalized understanding of ordered medications. Provider ordered medications through the hospital discharge pharmacy. The medications were given to the patient at time of discharge including home medications he had brought to the hospital. All scheduled follow up appointments were reviewed with the patient. Writer instructed them to call 24 hours in advance if he needs to cancel an appointment. Patient verbalized understanding of follow up appointments including when and how to cancel them if needed. Patient denied SI/SIB and HI prior to discharge today. Patient was instructed to call 911 and/or to go to the Emergency Room if he feels unsafe after discharge. Patient verbalized understanding of using 911 and/or to go to the Emergency Room if he has SIB/SI and/or HI after discharge from the hospital.  Patient denied SIB,SI and or HI  prior to discharge from the hospital today.All belongings were returned to patient including home medications and items secured in the security office. Patients Father is picking him up and transporting him home via automobile. Patient left the unit at 1325.

## 2024-08-22 NOTE — PROGRESS NOTES
Rehab Group    Start time: 1015  End time: 1100  Patient time total: 90 minutes    attended full group    #7 attended   Group Type: OT Clinic   Group Topic Covered: balanced lifestyle, coping skills, healthy leisure time, problem solving, and social skills     Group Session Detail:Occupational Therapy Clinic group to facilitate coping skill exploration, use of cognitive skills and problem solving, creative expression, clinical observation and facilitation of social, cognitive, and kinesthetic performance skills.       Patient Response/Contribution:  positive affect, cooperative with task, supportive of peers, safe use of materials/supplies, attentive, actively engaged, and verbalizations were off topic     Patient Detail:  Initiated group and sought out familiar creative task (drawing). Attentive to the details and planning while conversing with a peer. Needed some limit setting and redirection when conversation turned to politics and was distressing to peers. Sarcastic sense of humor that could be easily misconstrued. Responsive  to cuing.    Bright affect and positive mood.      [id number] OT Group (2 or more in attendance)      Patient Active Problem List   Diagnosis    Bipolar 1 disorder, depressed (H)    Bipolar 1 disorder, mixed, moderate (H)    Bipolar I disorder, most recent episode mixed, severe with psychotic features (H)    Class 3 severe obesity due to excess calories without serious comorbidity with body mass index (BMI) of 40.0 to 44.9 in adult (H)    Flank pain    Former smoker    GERD (gastroesophageal reflux disease)    History of cannabis abuse    History of diabetes insipidus    Insomnia due to other mental disorder    Mixed hyperlipidemia    Severe recurrent major depression with psychotic features, mood-congruent (H)    Suicidal ideation    Vitamin D insufficiency    Bipolar 1 disorder (H)    Bipolar affective disorder, currently manic, moderate (H)

## 2024-08-22 NOTE — PLAN OF CARE
Group Attendance:  attended partial group    Time session began: 2000  Time session ended: 2045  Patient's total time in group: 20    Total # Attendees   4   Group Type psychotherapeutic     Group Topic Covered insight improvement and healthy coping skills     Group Session Detail Group members checked in with how they are feeling and took time to process. Group then watched a JEREMIAH talk about shame and vulnerability. After the video group members discussed what they took away from the video, how they can incorporate empathy to work through shame, and the ideas discussed about vulnerability.      Patient's response to the group topic/interactions:  positive affect, cooperative with task, organized, supportive of peers, socially appropriate, listened actively, expressed understanding of topic, attentive, and actively engaged     Patient Details: Pt attended group for the last part of group. Pt watched the last part of the video and was engaged in discussion. Pt was supportive of peers.            [id number] - Group psychotherapy - 1 Session  Patient Active Problem List   Diagnosis    Bipolar 1 disorder, depressed (H)    Bipolar 1 disorder, mixed, moderate (H)    Bipolar I disorder, most recent episode mixed, severe with psychotic features (H)    Class 3 severe obesity due to excess calories without serious comorbidity with body mass index (BMI) of 40.0 to 44.9 in adult (H)    Flank pain    Former smoker    GERD (gastroesophageal reflux disease)    History of cannabis abuse    History of diabetes insipidus    Insomnia due to other mental disorder    Mixed hyperlipidemia    Severe recurrent major depression with psychotic features, mood-congruent (H)    Suicidal ideation    Vitamin D insufficiency    Bipolar 1 disorder (H)    Bipolar affective disorder, currently manic, moderate (H)

## 2024-08-22 NOTE — PLAN OF CARE
Upcoming Meetings and Dates/Important Information  Days since IP admission 9  Team Note Due Wednesday  No future appointments.   PMI [phone]     Next Steps  Establish care with new psych provider    Assessment/Intervention/Current Symptoms and Care Coordination  Chart Review  Met with team to discuss patient care.  Called Christa and scheduled pt with his existing provider. Updated AVS      Discharge Plan or Goal  Dispo Plan: Return home  Transportation: Father or cab  Medication: Wants in hand  Provisional discharge: Not needed  Safety plan complete?: Completed 8/20/24  Appointments: On AVS    Barriers to Discharge  patient requires further psychiatric stabilization due to current symptomology, medication management with possible adjustments      Referral Status  No new referral made    Legal Status  voluntary    Contacts  Extended Emergency Contact Information  Primary Emergency Contact: AMBROSE JASON  Mobile Phone: [phone]  Relation: Brother    Secondary Emergency Contact: Micheal Jason  Mobile Phone: [phone]  Relation: Father

## 2024-08-22 NOTE — PLAN OF CARE
BEH IP Unit Acuity Rating Score (UARS)  Patient is given one point for every criteria they meet.    CRITERIA SCORING   On a 72 hour hold, court hold, committed, stay of commitment, or revocation. 0    Patient LOS on BEH unit exceeds 20 days. 0  LOS: 9   Patient under guardianship, 55+, otherwise medically complex, or under age 11. 0   Suicide ideation without relief of precipitating factors. 0   Current plan for suicide. 0   Current plan for homicide. 0   Imminent risk or actual attempt to seriously harm another without relief of factors precipitating the attempt. 0   Severe dysfunction in daily living (ex: complete neglect for self care, extreme disruption in vegetative function, extreme deterioration in social interactions). 1   Recent (last 7 days) or current physical aggression in the ED or on unit. 1   Restraints or seclusion episode in past 72 hours. 0   Recent (last 7 days) or current verbal aggression, agitation, yelling, etc., while in the ED or unit. 1   Active psychosis. 1   Need for constant or near constant redirection (from leaving, from others, etc).  0   Intrusive or disruptive behaviors. 0   Patient requires 3 or more hours of individualized nursing care per 8-hour shift (i.e. for ADLs, meds, therapeutic interventions). 1   TOTAL 5

## 2024-08-23 ENCOUNTER — PATIENT OUTREACH (OUTPATIENT)
Dept: CARE COORDINATION | Facility: CLINIC | Age: 39
End: 2024-08-23
Payer: COMMERCIAL

## 2024-08-23 NOTE — PROGRESS NOTES
[facility]: Transitions of Care Outreach  Chief Complaint   Patient presents with    Clinic Care Coordination - Post Hospital       Most Recent Admission Date: 8/9/2024   Most Recent Admission Diagnosis: Bipolar affective disorder, currently manic, moderate (H) - F31.12     Most Recent Discharge Date: 8/22/2024   Most Recent Discharge Diagnosis: Bipolar affective disorder, currently manic, moderate (H) - F31.12  Anxiety - F41.9  Underdosing of selective serotonin and norepinephrine reuptake inhibitors, initial encounter - T43.216A  Non compliance w medication regimen - Z91.148  Insomnia due to other mental disorder - F51.05, F99  Schizoaffective disorder, bipolar type (H) - F25.0  Insomnia, unspecified type - G47.00  Tremor - R25.1  Former smoker - Z87.891  Bipolar 1 disorder, mixed, moderate (H) - F31.62  Prediabetes - R73.03     Transitions of Care Assessment    Discharge Assessment  How are you doing now that you are home?: Patient is doing well. He has no questions or concerns. He has community supports.  How are your symptoms? (Red Flag symptoms escalate to triage hotline per guidelines): Improved  Do you know how to contact your clinic care team if you have future questions or changes to your health status? : Yes  Does the patient have their discharge instructions? : Yes  Does the patient have questions regarding their discharge instructions? : No  Were you started on any new medications or were there changes to any of your previous medications? : Yes  Does the patient have all of their medications?: Yes  Do you have questions regarding any of your medications? : No  Do you have all of your needed medical supplies or equipment (DME)?  (i.e. oxygen tank, CPAP, cane, etc.): Yes           Follow up Plan     Discharge Follow-Up  Discharge follow up appointment scheduled in alignment with recommended follow up timeframe or Transitions of Risk Category? (Low = within 30 days; Moderate= within  14 days; High= within 7 days): Yes    No future appointments.    Outpatient Plan as outlined on AVS reviewed with patient.    For any urgent concerns, please contact our 24 hour nurse triage line: [phone] ([phone])       NIKOLAY Alfonso (she/her/hers)  Social Work Clinic Care Coordinator   [facility]  [email or web address]  [phone]

## 2024-08-27 ENCOUNTER — MEDICAL CORRESPONDENCE (OUTPATIENT)
Dept: HEALTH INFORMATION MANAGEMENT | Facility: CLINIC | Age: 39
End: 2024-08-27
Payer: COMMERCIAL

## 2024-08-30 ENCOUNTER — LAB (OUTPATIENT)
Dept: LAB | Facility: CLINIC | Age: 39
End: 2024-08-30
Payer: COMMERCIAL

## 2024-08-30 DIAGNOSIS — Z79.899 HIGH RISK MEDICATION USE: ICD-10-CM

## 2024-08-30 LAB
ANION GAP SERPL CALCULATED.3IONS-SCNC: 9 MMOL/L (ref 7–15)
BUN SERPL-MCNC: 8.3 MG/DL (ref 6–20)
CALCIUM SERPL-MCNC: 9.5 MG/DL (ref 8.8–10.4)
CHLORIDE SERPL-SCNC: 106 MMOL/L (ref 98–107)
CHOLEST SERPL-MCNC: 193 MG/DL
CREAT SERPL-MCNC: 0.69 MG/DL (ref 0.67–1.17)
EGFRCR SERPLBLD CKD-EPI 2021: >90 ML/MIN/1.73M2
FASTING STATUS PATIENT QL REPORTED: NO
FASTING STATUS PATIENT QL REPORTED: NO
GLUCOSE SERPL-MCNC: 175 MG/DL (ref 70–99)
HCO3 SERPL-SCNC: 27 MMOL/L (ref 22–29)
HDLC SERPL-MCNC: 26 MG/DL
IRON SERPL-MCNC: 48 UG/DL (ref 61–157)
LDLC SERPL CALC-MCNC: 93 MG/DL
LITHIUM SERPL-SCNC: <0.1 MMOL/L (ref 0.6–1.2)
NONHDLC SERPL-MCNC: 167 MG/DL
POTASSIUM SERPL-SCNC: 3.6 MMOL/L (ref 3.4–5.3)
SODIUM SERPL-SCNC: 142 MMOL/L (ref 135–145)
TRIGL SERPL-MCNC: 368 MG/DL
TSH SERPL DL<=0.005 MIU/L-ACNC: 1.86 UIU/ML (ref 0.3–4.2)

## 2024-08-30 PROCEDURE — 80178 ASSAY OF LITHIUM: CPT

## 2024-08-30 PROCEDURE — 84443 ASSAY THYROID STIM HORMONE: CPT

## 2024-08-30 PROCEDURE — 80048 BASIC METABOLIC PNL TOTAL CA: CPT

## 2024-08-30 PROCEDURE — 80061 LIPID PANEL: CPT

## 2024-08-30 PROCEDURE — 83540 ASSAY OF IRON: CPT

## 2024-08-30 PROCEDURE — 36415 COLL VENOUS BLD VENIPUNCTURE: CPT

## 2024-09-13 DIAGNOSIS — Z79.899 HIGH RISK MEDICATION USE: Primary | ICD-10-CM

## 2024-09-17 ENCOUNTER — LAB (OUTPATIENT)
Dept: LAB | Facility: CLINIC | Age: 39
End: 2024-09-17
Payer: COMMERCIAL

## 2024-09-17 DIAGNOSIS — Z79.899 HIGH RISK MEDICATION USE: ICD-10-CM

## 2024-09-17 LAB
ALBUMIN SERPL BCG-MCNC: 4.2 G/DL (ref 3.5–5.2)
ALP SERPL-CCNC: 83 U/L (ref 40–150)
ALT SERPL W P-5'-P-CCNC: 30 U/L (ref 0–70)
AMYLASE SERPL-CCNC: 35 U/L (ref 28–100)
AST SERPL W P-5'-P-CCNC: 22 U/L (ref 0–45)
BASOPHILS # BLD AUTO: 0.1 10E3/UL (ref 0–0.2)
BASOPHILS NFR BLD AUTO: 1 %
BILIRUB DIRECT SERPL-MCNC: <0.2 MG/DL (ref 0–0.3)
BILIRUB SERPL-MCNC: 0.3 MG/DL
EOSINOPHIL # BLD AUTO: 0.1 10E3/UL (ref 0–0.7)
EOSINOPHIL NFR BLD AUTO: 2 %
ERYTHROCYTE [DISTWIDTH] IN BLOOD BY AUTOMATED COUNT: 12.5 % (ref 10–15)
HCT VFR BLD AUTO: 43.9 % (ref 40–53)
HGB BLD-MCNC: 14.8 G/DL (ref 13.3–17.7)
IMM GRANULOCYTES # BLD: 0 10E3/UL
IMM GRANULOCYTES NFR BLD: 0 %
LYMPHOCYTES # BLD AUTO: 2.3 10E3/UL (ref 0.8–5.3)
LYMPHOCYTES NFR BLD AUTO: 39 %
MCH RBC QN AUTO: 29.2 PG (ref 26.5–33)
MCHC RBC AUTO-ENTMCNC: 33.7 G/DL (ref 31.5–36.5)
MCV RBC AUTO: 87 FL (ref 78–100)
MONOCYTES # BLD AUTO: 0.3 10E3/UL (ref 0–1.3)
MONOCYTES NFR BLD AUTO: 6 %
NEUTROPHILS # BLD AUTO: 3 10E3/UL (ref 1.6–8.3)
NEUTROPHILS NFR BLD AUTO: 52 %
NRBC # BLD AUTO: 0 10E3/UL
NRBC BLD AUTO-RTO: 0 /100
PLATELET # BLD AUTO: 166 10E3/UL (ref 150–450)
PROT SERPL-MCNC: 7.3 G/DL (ref 6.4–8.3)
RBC # BLD AUTO: 5.07 10E6/UL (ref 4.4–5.9)
VALPROATE SERPL-MCNC: 16.5 UG/ML
WBC # BLD AUTO: 5.8 10E3/UL (ref 4–11)

## 2024-09-17 PROCEDURE — 85025 COMPLETE CBC W/AUTO DIFF WBC: CPT

## 2024-09-17 PROCEDURE — 80164 ASSAY DIPROPYLACETIC ACD TOT: CPT

## 2024-09-17 PROCEDURE — 80076 HEPATIC FUNCTION PANEL: CPT

## 2024-09-17 PROCEDURE — 36415 COLL VENOUS BLD VENIPUNCTURE: CPT

## 2024-09-17 PROCEDURE — 82150 ASSAY OF AMYLASE: CPT

## 2025-01-23 ENCOUNTER — LAB (OUTPATIENT)
Dept: LAB | Facility: CLINIC | Age: 40
End: 2025-01-23
Payer: COMMERCIAL

## 2025-01-23 DIAGNOSIS — Z79.899 HIGH RISK MEDICATION USE: ICD-10-CM

## 2025-01-23 LAB
ALBUMIN SERPL BCG-MCNC: 4.4 G/DL (ref 3.5–5.2)
ALP SERPL-CCNC: 99 U/L (ref 40–150)
ALT SERPL W P-5'-P-CCNC: 63 U/L (ref 0–70)
ANION GAP SERPL CALCULATED.3IONS-SCNC: 14 MMOL/L (ref 7–15)
AST SERPL W P-5'-P-CCNC: 48 U/L (ref 0–45)
BASOPHILS # BLD AUTO: 0 10E3/UL (ref 0–0.2)
BASOPHILS NFR BLD AUTO: 1 %
BILIRUB SERPL-MCNC: 0.4 MG/DL
BUN SERPL-MCNC: 8.3 MG/DL (ref 6–20)
CALCIUM SERPL-MCNC: 9.5 MG/DL (ref 8.8–10.4)
CHLORIDE SERPL-SCNC: 99 MMOL/L (ref 98–107)
CHOLEST SERPL-MCNC: 248 MG/DL
CREAT SERPL-MCNC: 0.53 MG/DL (ref 0.67–1.17)
EGFRCR SERPLBLD CKD-EPI 2021: >90 ML/MIN/1.73M2
EOSINOPHIL # BLD AUTO: 0.1 10E3/UL (ref 0–0.7)
EOSINOPHIL NFR BLD AUTO: 2 %
ERYTHROCYTE [DISTWIDTH] IN BLOOD BY AUTOMATED COUNT: 12.4 % (ref 10–15)
FASTING STATUS PATIENT QL REPORTED: NO
FASTING STATUS PATIENT QL REPORTED: NO
GLUCOSE SERPL-MCNC: 226 MG/DL (ref 70–99)
HCO3 SERPL-SCNC: 24 MMOL/L (ref 22–29)
HCT VFR BLD AUTO: 42.5 % (ref 40–53)
HDLC SERPL-MCNC: 27 MG/DL
HGB BLD-MCNC: 14.7 G/DL (ref 13.3–17.7)
IMM GRANULOCYTES # BLD: 0 10E3/UL
IMM GRANULOCYTES NFR BLD: 0 %
LDLC SERPL CALC-MCNC: ABNORMAL MG/DL
LYMPHOCYTES # BLD AUTO: 2 10E3/UL (ref 0.8–5.3)
LYMPHOCYTES NFR BLD AUTO: 37 %
MCH RBC QN AUTO: 28.8 PG (ref 26.5–33)
MCHC RBC AUTO-ENTMCNC: 34.6 G/DL (ref 31.5–36.5)
MCV RBC AUTO: 83 FL (ref 78–100)
MONOCYTES # BLD AUTO: 0.4 10E3/UL (ref 0–1.3)
MONOCYTES NFR BLD AUTO: 7 %
NEUTROPHILS # BLD AUTO: 2.8 10E3/UL (ref 1.6–8.3)
NEUTROPHILS NFR BLD AUTO: 53 %
NONHDLC SERPL-MCNC: 221 MG/DL
NRBC # BLD AUTO: 0 10E3/UL
NRBC BLD AUTO-RTO: 0 /100
PLATELET # BLD AUTO: 163 10E3/UL (ref 150–450)
POTASSIUM SERPL-SCNC: 4 MMOL/L (ref 3.4–5.3)
PROLACTIN SERPL 3RD IS-MCNC: 12 NG/ML (ref 4–15)
PROT SERPL-MCNC: 7.6 G/DL (ref 6.4–8.3)
RBC # BLD AUTO: 5.1 10E6/UL (ref 4.4–5.9)
SODIUM SERPL-SCNC: 137 MMOL/L (ref 135–145)
TRIGL SERPL-MCNC: 487 MG/DL
VALPROATE SERPL-MCNC: 27.4 UG/ML
WBC # BLD AUTO: 5.3 10E3/UL (ref 4–11)